# Patient Record
Sex: FEMALE | Race: WHITE | NOT HISPANIC OR LATINO | Employment: OTHER | URBAN - NONMETROPOLITAN AREA
[De-identification: names, ages, dates, MRNs, and addresses within clinical notes are randomized per-mention and may not be internally consistent; named-entity substitution may affect disease eponyms.]

---

## 2017-01-05 DIAGNOSIS — E78.5 DYSLIPIDEMIA: ICD-10-CM

## 2017-01-06 RX ORDER — PRAVASTATIN SODIUM 20 MG
20 TABLET ORAL
Qty: 90 TAB | Refills: 3 | Status: SHIPPED | OUTPATIENT
Start: 2017-01-06 | End: 2017-12-27 | Stop reason: SDUPTHER

## 2017-02-08 ENCOUNTER — OFFICE VISIT (OUTPATIENT)
Dept: MEDICAL GROUP | Facility: PHYSICIAN GROUP | Age: 67
End: 2017-02-08
Payer: MEDICARE

## 2017-02-08 VITALS
HEIGHT: 64 IN | SYSTOLIC BLOOD PRESSURE: 140 MMHG | DIASTOLIC BLOOD PRESSURE: 72 MMHG | TEMPERATURE: 97.3 F | HEART RATE: 63 BPM | BODY MASS INDEX: 31.92 KG/M2 | OXYGEN SATURATION: 96 % | WEIGHT: 187 LBS | RESPIRATION RATE: 16 BRPM

## 2017-02-08 DIAGNOSIS — Z23 NEED FOR PNEUMOCOCCAL VACCINATION: ICD-10-CM

## 2017-02-08 DIAGNOSIS — R94.4 DECREASED GFR: Primary | ICD-10-CM

## 2017-02-08 PROCEDURE — 1101F PT FALLS ASSESS-DOCD LE1/YR: CPT | Performed by: NURSE PRACTITIONER

## 2017-02-08 PROCEDURE — 1036F TOBACCO NON-USER: CPT | Performed by: NURSE PRACTITIONER

## 2017-02-08 PROCEDURE — G8484 FLU IMMUNIZE NO ADMIN: HCPCS | Performed by: NURSE PRACTITIONER

## 2017-02-08 PROCEDURE — 3017F COLORECTAL CA SCREEN DOC REV: CPT | Performed by: NURSE PRACTITIONER

## 2017-02-08 PROCEDURE — 99213 OFFICE O/P EST LOW 20 MIN: CPT | Performed by: NURSE PRACTITIONER

## 2017-02-08 PROCEDURE — G8432 DEP SCR NOT DOC, RNG: HCPCS | Performed by: NURSE PRACTITIONER

## 2017-02-08 PROCEDURE — 3014F SCREEN MAMMO DOC REV: CPT | Performed by: NURSE PRACTITIONER

## 2017-02-08 PROCEDURE — G8419 CALC BMI OUT NRM PARAM NOF/U: HCPCS | Performed by: NURSE PRACTITIONER

## 2017-02-08 PROCEDURE — 4040F PNEUMOC VAC/ADMIN/RCVD: CPT | Mod: 8P | Performed by: NURSE PRACTITIONER

## 2017-02-08 ASSESSMENT — PAIN SCALES - GENERAL: PAINLEVEL: NO PAIN

## 2017-02-08 NOTE — MR AVS SNAPSHOT
"        Karolina LOPEZ Mando   2017 4:20 PM   Office Visit   MRN: [id number]    Department:  [facility]   Dept Phone:  [phone]    Description:  Female : 1950   Provider:  FELICE Cherry           Reason for Visit     Follow-Up lab results       Allergies as of 2017     Allergen Noted Reactions    Augmentin      Ciprofloxacin      Neomycin 2017         You were diagnosed with     Need for pneumococcal vaccination   [[id number]]       Decreased GFR   [[id number]]         Vital Signs     Blood Pressure Pulse Temperature Respirations Height Weight    140/72 mmHg 63 36.3 °C (97.3 °F) 16 1.626 m (5' 4\") 84.823 kg (187 lb)    Body Mass Index Oxygen Saturation Smoking Status             32.08 kg/m2 96% Never Smoker          Basic Information     Date Of Birth Sex Race Ethnicity Preferred Language    1950 Female White Non- English      Your appointments     2018  2:20 PM   Established Patient with Adry MALDONADO M.D.   [facility] (--)    [address], [place] [id number] [phone]           You will be receiving a confirmation call a few days before your appointment from our automated call confirmation system.              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   2014 - Present    Cerumen debris on tympanic membrane of right ear H61.21   2016 - Present    Mitral regurgitation I34.0   2016 - Present    Decreased GFR R94.4   2017 - Present      Health Maintenance        Date Due Completion Dates    IMM DTaP/Tdap/Td Vaccine " (1 - Tdap) 2/27/1970 ---    IMM ZOSTER VACCINE 2/27/2011 ---    IMM PNEUMOCOCCAL 65+ (ADULT) LOW/MEDIUM RISK SERIES (1 of 2 - PCV13) 2/27/2016 ---    IMM INFLUENZA (1) 9/1/2016 ---    MAMMOGRAM 10/14/2017 10/14/2016, 10/12/2015, 10/10/2014, 10/9/2013, 9/27/2012, 9/21/2011, 9/20/2010, 9/8/2009, 9/8/2009, 8/27/2008, 8/27/2008, 8/22/2007, 8/22/2007    COLONOSCOPY 3/6/2019 3/6/2014    BONE DENSITY 10/12/2020 10/12/2015, 5/6/2009, 11/10/2005            Current Immunizations     13-VALENT PCV PREVNAR  Deferred (Patient Refused)      Below and/or attached are the medications your provider expects you to take. Review all of your home medications and newly ordered medications with your provider and/or pharmacist. Follow medication instructions as directed by your provider and/or pharmacist. Please keep your medication list with you and share with your provider. Update the information when medications are discontinued, doses are changed, or new medications (including over-the-counter products) are added; and carry medication information at all times in the event of emergency situations     Allergies:  AUGMENTIN - (reactions not documented)     CIPROFLOXACIN - (reactions not documented)     NEOMYCIN - (reactions not documented)               Medications  Valid as of: February 08, 2017 -  4:28 PM    Generic Name Brand Name Tablet Size Instructions for use    AmLODIPine Besylate (Tab) NORVASC 10 MG TAKE ONE TABLET BY MOUTH ONE TIME DAILY        Aspirin (Tablet Delayed Response) ECOTRIN 81 MG Take 81 mg by mouth every day.        Cholecalciferol (Tab) cholecalciferol 1000 UNIT Take 1,000 Units by mouth every day.        Estradiol (Tab) ESTRACE 0.5 MG Take 0.5 mg by mouth every day.        Lisinopril (Tab) PRINIVIL, ZESTRIL 40 MG Take 1 Tab by mouth every day.        Multiple Vitamins-Minerals   Take  by mouth.        Omega-3 Fatty Acids (Cap) OMEGA 3 FA 1000 MG Take 1,000 mg by mouth every day.        Pantoprazole Sodium (Tablet  Delayed Response) PROTONIX 40 MG Take 40 mg by mouth every day.        Pravastatin Sodium (Tab) PRAVACHOL 20 MG Take 1 Tab by mouth every bedtime.        Probiotic Product   Take  by mouth.        .                 Medicines prescribed today [facility] to:     [facility] [place] - [address]    [address] [address]    Phone: [phone] Fax: [phone]    Open 24 Hours?: No      Medication refill instructions:       If your prescription bottle indicates you have medication refills left, it is not necessary to call your provider’s office. Please contact your pharmacy and they will refill your medication.    If your prescription bottle indicates you do not have any refills left, you may request refills at any time through one of the following ways: The online [employer] system (except Urgent Care), by calling your provider’s office, or by asking your pharmacy to contact your provider’s office with a refill request. Medication refills are processed only during regular business hours and may not be available until the next business day. Your provider may request additional information or to have a follow-up visit with you prior to refilling your medication.   *Please Note: Medication refills are assigned a new Rx number when refilled electronically. Your pharmacy may indicate that no refills were authorized even though a new prescription for the same medication is available at the pharmacy. Please request the medicine by name with the pharmacy before contacting your provider for a refill.        Instructions    Pneumococcal Vaccine, Polyvalent suspension for injection  What is this medicine?  PNEUMOCOCCAL VACCINE, POLYVALENT (AMARIS mo YOSVANY al navya DAVIS, rigo saw muhammad) is a vaccine to prevent pneumococcus bacteria infection. These bacteria are a major cause of ear infections, 'Strep throat' infections, and serious pneumonia, meningitis, or blood infections worldwide. These vaccines  help the body to produce antibodies (protective substances) that help your body defend against these bacteria. This vaccine is recommended for infants and young children. This vaccine will not treat an infection.  This medicine may be used for other purposes; ask your health care provider or pharmacist if you have questions.  COMMON BRAND NAME(S): Prevnar 13 , Prevnar  What should I tell my health care provider before I take this medicine?  They need to know if you have any of these conditions:  -bleeding problems  -fever  -immune system problems  -low platelet count in the blood  -seizures  -an unusual or allergic reaction to pneumococcal vaccine, diphtheria toxoid, other vaccines, latex, other medicines, foods, dyes, or preservatives  -pregnant or trying to get pregnant  -breast-feeding  How should I use this medicine?  This vaccine is for injection into a muscle. It is given by a health care professional.  A copy of Vaccine Information Statements will be given before each vaccination. Read this sheet carefully each time. The sheet may change frequently.  Talk to your pediatrician regarding the use of this medicine in children. While this drug may be prescribed for children as young as 6 weeks old for selected conditions, precautions do apply.  Overdosage: If you think you have taken too much of this medicine contact a poison control center or emergency room at once.  NOTE: This medicine is only for you. Do not share this medicine with others.  What if I miss a dose?  It is important not to miss your dose. Call your doctor or health care professional if you are unable to keep an appointment.  What may interact with this medicine?  -medicines for cancer chemotherapy  -medicines that suppress your immune function  -medicines that treat or prevent blood clots like warfarin, enoxaparin, and dalteparin  -steroid medicines like prednisone or cortisone  This list may not describe all possible interactions. Give your  health care provider a list of all the medicines, herbs, non-prescription drugs, or dietary supplements you use. Also tell them if you smoke, drink alcohol, or use illegal drugs. Some items may interact with your medicine.  What should I watch for while using this medicine?  Mild fever and pain should go away in 3 days or less. Report any unusual symptoms to your doctor or health care professional.  What side effects may I notice from receiving this medicine?  Side effects that you should report to your doctor or health care professional as soon as possible:  -allergic reactions like skin rash, itching or hives, swelling of the face, lips, or tongue  -breathing problems  -confused  -fever over 102 degrees F  -pain, tingling, numbness in the hands or feet  -seizures  -unusual bleeding or bruising  -unusual muscle weakness  Side effects that usually do not require medical attention (report to your doctor or health care professional if they continue or are bothersome):  -aches and pains  -diarrhea  -fever of 102 degrees F or less  -headache  -irritable  -loss of appetite  -pain, tender at site where injected  -trouble sleeping  This list may not describe all possible side effects. Call your doctor for medical advice about side effects. You may report side effects to FDA at 1-293-FDA-1354.  Where should I keep my medicine?  This does not apply. This vaccine is given in a clinic, pharmacy, doctor's office, or other health care setting and will not be stored at home.  NOTE: This sheet is a summary. It may not cover all possible information. If you have questions about this medicine, talk to your doctor, pharmacist, or health care provider.  © 2014, Elsevier/Gold Standard. (3/2/2010 10:17:22 AM)            [employer] Access Code: Activation code not generated  Current [employer] Status: Active

## 2017-02-09 NOTE — PATIENT INSTRUCTIONS
Pneumococcal Vaccine, Polyvalent suspension for injection  What is this medicine?  PNEUMOCOCCAL VACCINE, POLYVALENT (AMARIS mo YOSVANY al navya DAVIS, rigo muhammad) is a vaccine to prevent pneumococcus bacteria infection. These bacteria are a major cause of ear infections, 'Strep throat' infections, and serious pneumonia, meningitis, or blood infections worldwide. These vaccines help the body to produce antibodies (protective substances) that help your body defend against these bacteria. This vaccine is recommended for infants and young children. This vaccine will not treat an infection.  This medicine may be used for other purposes; ask your health care provider or pharmacist if you have questions.  COMMON BRAND NAME(S): Prevnar 13 , Prevnar  What should I tell my health care provider before I take this medicine?  They need to know if you have any of these conditions:  -bleeding problems  -fever  -immune system problems  -low platelet count in the blood  -seizures  -an unusual or allergic reaction to pneumococcal vaccine, diphtheria toxoid, other vaccines, latex, other medicines, foods, dyes, or preservatives  -pregnant or trying to get pregnant  -breast-feeding  How should I use this medicine?  This vaccine is for injection into a muscle. It is given by a health care professional.  A copy of Vaccine Information Statements will be given before each vaccination. Read this sheet carefully each time. The sheet may change frequently.  Talk to your pediatrician regarding the use of this medicine in children. While this drug may be prescribed for children as young as 6 weeks old for selected conditions, precautions do apply.  Overdosage: If you think you have taken too much of this medicine contact a poison control center or emergency room at once.  NOTE: This medicine is only for you. Do not share this medicine with others.  What if I miss a dose?  It is important not to miss your dose. Call your doctor or health care  professional if you are unable to keep an appointment.  What may interact with this medicine?  -medicines for cancer chemotherapy  -medicines that suppress your immune function  -medicines that treat or prevent blood clots like warfarin, enoxaparin, and dalteparin  -steroid medicines like prednisone or cortisone  This list may not describe all possible interactions. Give your health care provider a list of all the medicines, herbs, non-prescription drugs, or dietary supplements you use. Also tell them if you smoke, drink alcohol, or use illegal drugs. Some items may interact with your medicine.  What should I watch for while using this medicine?  Mild fever and pain should go away in 3 days or less. Report any unusual symptoms to your doctor or health care professional.  What side effects may I notice from receiving this medicine?  Side effects that you should report to your doctor or health care professional as soon as possible:  -allergic reactions like skin rash, itching or hives, swelling of the face, lips, or tongue  -breathing problems  -confused  -fever over 102 degrees F  -pain, tingling, numbness in the hands or feet  -seizures  -unusual bleeding or bruising  -unusual muscle weakness  Side effects that usually do not require medical attention (report to your doctor or health care professional if they continue or are bothersome):  -aches and pains  -diarrhea  -fever of 102 degrees F or less  -headache  -irritable  -loss of appetite  -pain, tender at site where injected  -trouble sleeping  This list may not describe all possible side effects. Call your doctor for medical advice about side effects. You may report side effects to FDA at 4-468-FDA-1744.  Where should I keep my medicine?  This does not apply. This vaccine is given in a clinic, pharmacy, doctor's office, or other health care setting and will not be stored at home.  NOTE: This sheet is a summary. It may not cover all possible information. If you  have questions about this medicine, talk to your doctor, pharmacist, or health care provider.  © 2014, Elsevier/Gold Standard. (3/2/2010 10:17:22 AM)

## 2017-02-09 NOTE — ASSESSMENT & PLAN NOTE
This is a new problem. Reviewed labs with the patient, GFR modestly depressed at 54, creatinine, potassium, BUN are all normal. Patient denies daily use of NSAIDs. She is not diabetic. She does have hypertension and this is well controlled on ACE inhibitor/lisinopril 40 mg and amlodipine 10 mg daily. We discussed the importance of pushing fluids, hypertension and glucose control. Avoid NSAIDs whenever possible. Monitor yearly.

## 2017-02-09 NOTE — PROGRESS NOTES
[facility]  Primary Care Office Visit - Problem-Oriented        History:     Karolina Gilmore is a 66 y.o. female who is here today to discuss Follow-Up      Decreased GFR  This is a new problem. Reviewed labs with the patient, GFR modestly depressed at 54, creatinine, potassium, BUN are all normal. Patient denies daily use of NSAIDs. She is not diabetic. She does have hypertension and this is well controlled on ACE inhibitor/lisinopril 40 mg and amlodipine 10 mg daily. We discussed the importance of pushing fluids, hypertension and glucose control. Avoid NSAIDs whenever possible. Monitor yearly.    Discussed shingles vaccine, patient is allergic to neomycin which is a component of Zostavax, she is not a candidate. Due for pneumonia vaccines, discussed Prevnar 13, patient would like more information on this and will call the office if she would like to have this done.      Past Medical History   Diagnosis Date   • GERD (gastroesophageal reflux disease)    • WEBBER (nonalcoholic steatohepatitis)    • Cerumen debris on tympanic membrane of right ear 5/24/2016     Past Surgical History   Procedure Laterality Date   • Hysterectomy, total abdominal  96   • Carpal tunnel release       right     Social History     Social History   • Marital Status:      Spouse Name: N/A   • Number of Children: N/A   • Years of Education: N/A     Occupational History   • Not on file.     Social History Main Topics   • Smoking status: Never Smoker    • Smokeless tobacco: Never Used   • Alcohol Use: 0.0 oz/week     0 Standard drinks or equivalent per week      Comment: once a month   • Drug Use: No   • Sexual Activity: No     Other Topics Concern   • Not on file     Social History Narrative     History   Smoking status   • Never Smoker    Smokeless tobacco   • Never Used     Family History   Problem Relation Age of Onset   • Heart Attack Mother 76   • Stroke Mother    • Other Father 52     Brain tumour      Allergies  "  Allergen Reactions   • Augmentin    • Ciprofloxacin    • Neomycin        Problem List:     Patient Active Problem List    Diagnosis Date Noted   • Decreased GFR 02/08/2017   • Mitral regurgitation 11/07/2016   • Cerumen debris on tympanic membrane of right ear 05/24/2016   • Hypertension 04/17/2014   • Pericardial effusion 04/17/2014   • Postmenopausal HRT (hormone replacement therapy) 04/17/2014   • Dyslipidemia 04/17/2014   • GERD (gastroesophageal reflux disease) 04/17/2014   • Femoral acetabular impingement 04/17/2014   • HEMATURIA 07/22/2013         Medications:     Current outpatient prescriptions:   •  pravastatin (PRAVACHOL) 20 MG Tab, Take 1 Tab by mouth every bedtime., Disp: 90 Tab, Rfl: 3  •  lisinopril (PRINIVIL, ZESTRIL) 40 MG tablet, Take 1 Tab by mouth every day., Disp: 90 Tab, Rfl: 3  •  amlodipine (NORVASC) 10 MG Tab, TAKE ONE TABLET BY MOUTH ONE TIME DAILY, Disp: 90 Tab, Rfl: 3  •  estradiol (ESTRACE) 0.5 MG tablet, Take 0.5 mg by mouth every day., Disp: , Rfl:   •  aspirin EC (ECOTRIN) 81 MG Tablet Delayed Response, Take 81 mg by mouth every day., Disp: , Rfl:   •  pantoprazole (PROTONIX) 40 MG TBEC, Take 40 mg by mouth every day., Disp: , Rfl:   •  Probiotic Product (PROBIOTIC DAILY PO), Take  by mouth., Disp: , Rfl:   •  Multiple Vitamins-Minerals (MULTIVITAMIN PO), Take  by mouth., Disp: , Rfl:   •  vitamin D (CHOLECALCIFEROL) 1000 UNIT TABS, Take 1,000 Units by mouth every day., Disp: , Rfl:   •  docosahexanoic acid (OMEGA 3 FA) 1000 MG CAPS, Take 1,000 mg by mouth every day., Disp: , Rfl:       Review of Systems:     Comprehensive review of systems negative.    Physical Assessment:     VS: /72 mmHg  Pulse 63  Temp(Src) 36.3 °C (97.3 °F)  Resp 16  Ht 1.626 m (5' 4\")  Wt 84.823 kg (187 lb)  BMI 32.08 kg/m2  SpO2 96%    General: Well-developed, well-nourished female     Head: PERRL, EOMI. Normocephalic. No facial asymmetry noted.  Cardiovasc:No JVD.  RRR. Systolic murmur. No " thrills or bruits. Pulses 2+ and symmetric at all distal extremities.  Pulmonary: Lungs clear bilaterally.  Normal respiratory effort. No wheeze or crackles.   Extremities: No edema, no TTP bilateral calves. Pedal pulses intact. No joint effusions. LEs warm and well-perfused.  Neuro: Alert and oriented  Skin:No rashes noted. Skin warm, dry, intact      Psych: Dressed appropriately for the weather, pleasant and conversant.  Affect, mood & judgment appropriate.    Assessment/Plan:   Karolina was seen today for follow-up.    Diagnoses and all orders for this visit:    Decreased GFR, new   - Reassurance provided, push fluids, avoid NSAIDs, continue ACE inhibitor, discussed the importance of hypertensive and glycemic control. Recommend monitoring every 6-12 months.    Need for pneumococcal vaccination  -     PNEUMOCOCCAL CONJUGATE VACCINE 13-VALENT    Patient is agreeable to the above plan and voiced understanding. All questions answered.     Please note that this dictation was created using voice recognition software. I have made every reasonable attempt to correct obvious errors, but I expect that there are errors of grammar and possibly content that I did not discover before finalizing the note.      DENNY Hickey  2/8/2017, 4:29 PM

## 2017-05-24 ENCOUNTER — OFFICE VISIT (OUTPATIENT)
Dept: MEDICAL GROUP | Facility: PHYSICIAN GROUP | Age: 67
End: 2017-05-24
Payer: MEDICARE

## 2017-05-24 VITALS
OXYGEN SATURATION: 93 % | HEIGHT: 64 IN | SYSTOLIC BLOOD PRESSURE: 140 MMHG | RESPIRATION RATE: 16 BRPM | HEART RATE: 76 BPM | TEMPERATURE: 97.5 F | DIASTOLIC BLOOD PRESSURE: 82 MMHG | WEIGHT: 187 LBS | BODY MASS INDEX: 31.92 KG/M2

## 2017-05-24 DIAGNOSIS — I10 ESSENTIAL HYPERTENSION: ICD-10-CM

## 2017-05-24 DIAGNOSIS — R42 VERTIGO: ICD-10-CM

## 2017-05-24 DIAGNOSIS — E78.5 HYPERLIPIDEMIA, UNSPECIFIED HYPERLIPIDEMIA TYPE: ICD-10-CM

## 2017-05-24 PROCEDURE — G8432 DEP SCR NOT DOC, RNG: HCPCS | Performed by: INTERNAL MEDICINE

## 2017-05-24 PROCEDURE — 3014F SCREEN MAMMO DOC REV: CPT | Performed by: INTERNAL MEDICINE

## 2017-05-24 PROCEDURE — 99214 OFFICE O/P EST MOD 30 MIN: CPT | Performed by: INTERNAL MEDICINE

## 2017-05-24 PROCEDURE — 4040F PNEUMOC VAC/ADMIN/RCVD: CPT | Mod: 8P | Performed by: INTERNAL MEDICINE

## 2017-05-24 PROCEDURE — G8419 CALC BMI OUT NRM PARAM NOF/U: HCPCS | Performed by: INTERNAL MEDICINE

## 2017-05-24 PROCEDURE — 1101F PT FALLS ASSESS-DOCD LE1/YR: CPT | Performed by: INTERNAL MEDICINE

## 2017-05-24 PROCEDURE — 3017F COLORECTAL CA SCREEN DOC REV: CPT | Performed by: INTERNAL MEDICINE

## 2017-05-24 PROCEDURE — 1036F TOBACCO NON-USER: CPT | Performed by: INTERNAL MEDICINE

## 2017-05-24 RX ORDER — MECLIZINE HCL 12.5 MG/1
12.5 TABLET ORAL 3 TIMES DAILY PRN
Qty: 30 TAB | Refills: 0 | Status: SHIPPED | OUTPATIENT
Start: 2017-05-24 | End: 2017-05-31 | Stop reason: SDUPTHER

## 2017-05-24 ASSESSMENT — PATIENT HEALTH QUESTIONNAIRE - PHQ9: CLINICAL INTERPRETATION OF PHQ2 SCORE: 0

## 2017-05-24 ASSESSMENT — PAIN SCALES - GENERAL: PAINLEVEL: NO PAIN

## 2017-05-24 NOTE — ASSESSMENT & PLAN NOTE
Follows at home and has noted no change, am readings 115-120/70 generally.  She is taking amlodipine and lisinopril.

## 2017-05-24 NOTE — PROGRESS NOTES
Chief Complaint   Patient presents with   • Dizziness     dizziness x6 weeks        HISTORY OF PRESENT ILLNESS: Patient is a 67 y.o. female established patient who presents today to discuss the medical issues below.    Vertigo  Patient is reporting a new complaint, she did take a long drive to [place]. Upon getting home she had reoccurrence of vague wobbly sensation. She's had this in the past and it resolved. Currently for the last 6 weeks it is persisting. It does not seem to be getting better or worse. It is not necessarily associated with position changes she is not having any problems with headaches or visual problems no double vision, she does note a jerking sensation of her neck periodically which does not seem to be associated with the wobbliness. The neck jerking also ×6 weeks. No nausea or vomiting. No numbness tingling or weakness of her arms or legs. No difficulty with hearing no muffled sounds sensation no sinus congestion. She has not tried any medications but she did look up on the Internet and is wondering if she has disembarkment syndrome.  Patient is vaguely able to identify that it's worse in the morning and improves throughout the day but does tend to come and go all day long several times during the day.    Hypertension  Follows at home and has noted no change, am readings 115-120/70 generally.  She is taking amlodipine and lisinopril.       Patient Active Problem List    Diagnosis Date Noted   • Vertigo 05/24/2017   • Decreased GFR 02/08/2017   • Mitral regurgitation 11/07/2016   • Cerumen debris on tympanic membrane of right ear 05/24/2016   • Hypertension 04/17/2014   • Pericardial effusion 04/17/2014   • Postmenopausal HRT (hormone replacement therapy) 04/17/2014   • Dyslipidemia 04/17/2014   • GERD (gastroesophageal reflux disease) 04/17/2014   • Femoral acetabular impingement 04/17/2014   • HEMATURIA 07/22/2013       Allergies:Augmentin; Ciprofloxacin; and Neomycin    Current Outpatient  Prescriptions   Medication Sig Dispense Refill   • meclizine (ANTIVERT) 12.5 MG Tab Take 1 Tab by mouth 3 times a day as needed. 30 Tab 0   • pravastatin (PRAVACHOL) 20 MG Tab Take 1 Tab by mouth every bedtime. 90 Tab 3   • lisinopril (PRINIVIL, ZESTRIL) 40 MG tablet Take 1 Tab by mouth every day. 90 Tab 3   • amlodipine (NORVASC) 10 MG Tab TAKE ONE TABLET BY MOUTH ONE TIME DAILY 90 Tab 3   • estradiol (ESTRACE) 0.5 MG tablet Take 0.5 mg by mouth every day.     • aspirin EC (ECOTRIN) 81 MG Tablet Delayed Response Take 81 mg by mouth every day.     • pantoprazole (PROTONIX) 40 MG TBEC Take 40 mg by mouth every day.     • Probiotic Product (PROBIOTIC DAILY PO) Take  by mouth.     • Multiple Vitamins-Minerals (MULTIVITAMIN PO) Take  by mouth.     • vitamin D (CHOLECALCIFEROL) 1000 UNIT TABS Take 1,000 Units by mouth every day.     • docosahexanoic acid (OMEGA 3 FA) 1000 MG CAPS Take 1,000 mg by mouth every day.       No current facility-administered medications for this visit.         Past Medical History   Diagnosis Date   • GERD (gastroesophageal reflux disease)    • WEBBER (nonalcoholic steatohepatitis)    • Cerumen debris on tympanic membrane of right ear 2016   • Vertigo 2017       Social History   Substance Use Topics   • Smoking status: Never Smoker    • Smokeless tobacco: Never Used   • Alcohol Use: No       Family Status   Relation Status Death Age   • Mother     • Father     • Sister  63     dementia     Family History   Problem Relation Age of Onset   • Heart Attack Mother 76   • Stroke Mother    • Other Father 52     Brain tumour        ROS:    Respiratory: Negative for cough, sputum production, shortness of breath or wheezing.    Cardiovascular: Negative for chest pain, palpitations, orthopnea, dyspnea with exertion or edema.   Gastrointestinal: Negative for GI upset, nausea, vomiting, abdominal pain, constipation or diarrhea.   Genitourinary: Negative for dysuria, urgency,  "hesitancy or frequency.       Exam:    Blood pressure 140/82, pulse 76, temperature 36.4 °C (97.5 °F), resp. rate 16, height 1.626 m (5' 4.02\"), weight 84.823 kg (187 lb), SpO2 93 %.  General:  Well nourished, well developed female in NAD.  HENT: Normocephalic, bilateral TMs are intact, nasal and oral mucosa with no lesions,   Neck: Supple without bruit. Thyroid is not enlarged.  Pulmonary: Clear to ausculation and percussion.  Normal effort. No rales, rhonchi, or wheezing.  Cardiovascular: Regular rate and rhythm without murmur.   Abdomen: Normal bowel sounds soft and nontender no palpable liver spleen bladder mass.  Extremities: No LE edema noted.  Neuro: Grossly nonfocal. No nystagmus no recurrence of symptoms with Epley maneuver, negative Romberg, gait stable. Vague slight intention tremor with finger nose testing, vague ratcheting on rigidity testing.  Psych: Alert and oriented to person, place, and time. Appropriate mood and conversation.        This dictation was created using voice recognition software. I have made reasonable attempts to correct errors, however, errors of grammar and content may exist.          Assessment/Plan:    1. Vertigo  Neurologic exam is normal today. She does not have clinical history consistent with vestibular neuronitis or positional vertigo. Differential could include an early Parkinson's or other nonspecific neurologic problem. Will check a TSH, referral to neurology. Meclizine for when necessary monitor. Atypical for dismembarkment syndrome to my examination.  - REFERRAL TO NEUROLOGY    2. Essential hypertension  Borderline here home readings are better ongoing monitoring with labs  - COMP METABOLIC PANEL; Future  - CBC WITH DIFFERENTIAL; Future    3. Hyperlipidemia, unspecified hyperlipidemia type  On meds due for lab monitoring  - LIPID PROFILE; Future    Patient was seen for  25 minutes face to face of which more than 50% of the time was spent in counseling and coordination " of care regarding the above problems.

## 2017-05-24 NOTE — MR AVS SNAPSHOT
"        Karolina LOPEZ Gilmore   2017 2:00 PM   Office Visit   MRN: [id number]    Department:  [facility]   Dept Phone:  [phone]    Description:  Female : 1950   Provider:  Adry MALDONADO M.D.           Reason for Visit     Dizziness dizziness x6 weeks       Allergies as of 2017     Allergen Noted Reactions    Augmentin      Ciprofloxacin      Neomycin 2017         You were diagnosed with     Vertigo   [845309]       Essential hypertension   [4049611]       Hyperlipidemia, unspecified hyperlipidemia type   [1275627]         Vital Signs     Blood Pressure Pulse Temperature Respirations Height Weight    140/82 mmHg 76 36.4 °C (97.5 °F) 16 1.626 m (5' 4.02\") 84.823 kg (187 lb)    Body Mass Index Oxygen Saturation Smoking Status             32.08 kg/m2 93% Never Smoker          Basic Information     Date Of Birth Sex Race Ethnicity Preferred Language    1950 Female White Non- English      Your appointments     Sep 14, 2017  2:00 PM   Established Patient with Adry MALDONADO M.D.   [facility] (--)    [address] [id number] [phone]           You will be receiving a confirmation call a few days before your appointment from our automated call confirmation system.              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   2014 - Present    Cerumen debris on tympanic membrane of right ear H61.21   2016 - Present    Mitral regurgitation I34.0   2016 - Present    Decreased GFR R94.4   2017 - Present    Vertigo R42   2017 " - Present      Health Maintenance        Date Due Completion Dates    IMM DTaP/Tdap/Td Vaccine (1 - Tdap) 2/27/1970 ---    IMM ZOSTER VACCINE 2/27/2011 ---    IMM PNEUMOCOCCAL 65+ (ADULT) LOW/MEDIUM RISK SERIES (1 of 2 - PCV13) 2/27/2016 ---    MAMMOGRAM 10/14/2017 10/14/2016, 10/12/2015, 10/10/2014, 10/9/2013, 9/27/2012, 9/21/2011, 9/20/2010, 9/8/2009, 9/8/2009, 8/27/2008, 8/27/2008, 8/22/2007, 8/22/2007    COLONOSCOPY 3/6/2019 3/6/2014    BONE DENSITY 10/12/2020 10/12/2015, 5/6/2009, 11/10/2005            Current Immunizations     13-VALENT PCV PREVNAR  Deferred (Patient Refused)      Below and/or attached are the medications your provider expects you to take. Review all of your home medications and newly ordered medications with your provider and/or pharmacist. Follow medication instructions as directed by your provider and/or pharmacist. Please keep your medication list with you and share with your provider. Update the information when medications are discontinued, doses are changed, or new medications (including over-the-counter products) are added; and carry medication information at all times in the event of emergency situations     Allergies:  AUGMENTIN - (reactions not documented)     CIPROFLOXACIN - (reactions not documented)     NEOMYCIN - (reactions not documented)               Medications  Valid as of: May 24, 2017 -  2:45 PM    Generic Name Brand Name Tablet Size Instructions for use    AmLODIPine Besylate (Tab) NORVASC 10 MG TAKE ONE TABLET BY MOUTH ONE TIME DAILY        Aspirin (Tablet Delayed Response) ECOTRIN 81 MG Take 81 mg by mouth every day.        Cholecalciferol (Tab) cholecalciferol 1000 UNIT Take 1,000 Units by mouth every day.        Estradiol (Tab) ESTRACE 0.5 MG Take 0.5 mg by mouth every day.        Lisinopril (Tab) PRINIVIL, ZESTRIL 40 MG Take 1 Tab by mouth every day.        Meclizine HCl (Tab) ANTIVERT 12.5 MG Take 1 Tab by mouth 3 times a day as needed.        Multiple  Vitamins-Minerals   Take  by mouth.        Omega-3 Fatty Acids (Cap) OMEGA 3 FA 1000 MG Take 1,000 mg by mouth every day.        Pantoprazole Sodium (Tablet Delayed Response) PROTONIX 40 MG Take 40 mg by mouth every day.        Pravastatin Sodium (Tab) PRAVACHOL 20 MG Take 1 Tab by mouth every bedtime.        Probiotic Product   Take  by mouth.        .                 Medicines prescribed today were sent to:     [facility] # [place], [place] - [address] [facility] [address]    Phone: [phone] Fax: [phone]    Open 24 Hours?: No      Medication refill instructions:       If your prescription bottle indicates you have medication refills left, it is not necessary to call your provider’s office. Please contact your pharmacy and they will refill your medication.    If your prescription bottle indicates you do not have any refills left, you may request refills at any time through one of the following ways: The online [facility] system (except Urgent Care), by calling your provider’s office, or by asking your pharmacy to contact your provider’s office with a refill request. Medication refills are processed only during regular business hours and may not be available until the next business day. Your provider may request additional information or to have a follow-up visit with you prior to refilling your medication.   *Please Note: Medication refills are assigned a new Rx number when refilled electronically. Your pharmacy may indicate that no refills were authorized even though a new prescription for the same medication is available at the pharmacy. Please request the medicine by name with the pharmacy before contacting your provider for a refill.        Your To Do List     Future Labs/Procedures Complete By Expires    CBC WITH DIFFERENTIAL  As directed 5/24/2018    COMP METABOLIC PANEL  As directed 5/24/2018    LIPID PROFILE  As directed 5/24/2018      Referral     A referral request  has been sent to our patient care coordination department. Please allow 3-5 business days for us to process this request and contact you either by phone or mail. If you do not hear from us by the 5th business day, please call us at [phone].        Instructions    bp monitor at different times of day  Neurology  Recheck labs.            Medrio Access Code: Activation code not generated  Current Medrio Status: Active

## 2017-05-24 NOTE — ASSESSMENT & PLAN NOTE
Patient is reporting a new complaint, she did take a long drive to [place]. Upon getting home she had reoccurrence of vague wobbly sensation. She's had this in the past and it resolved. Currently for the last 6 weeks it is persisting. It does not seem to be getting better or worse. It is not necessarily associated with position changes she is not having any problems with headaches or visual problems no double vision, she does note a jerking sensation of her neck periodically which does not seem to be associated with the wobbliness. The neck jerking also ×6 weeks. No nausea or vomiting. No numbness tingling or weakness of her arms or legs. No difficulty with hearing no muffled sounds sensation no sinus congestion. She has not tried any medications but she did look up on the Internet and is wondering if she has disembarkment syndrome.  Patient is vaguely able to identify that it's worse in the morning and improves throughout the day but does tend to come and go all day long several times during the day.

## 2017-05-31 RX ORDER — MECLIZINE HCL 12.5 MG/1
12.5 TABLET ORAL 3 TIMES DAILY PRN
Qty: 30 TAB | Refills: 0 | Status: SHIPPED | OUTPATIENT
Start: 2017-05-31 | End: 2017-06-25 | Stop reason: SDUPTHER

## 2017-07-14 ENCOUNTER — APPOINTMENT (OUTPATIENT)
Dept: SCHEDULING | Facility: IMAGING CENTER | Age: 67
End: 2017-07-14
Payer: MEDICARE

## 2017-10-18 ENCOUNTER — HOSPITAL ENCOUNTER (OUTPATIENT)
Dept: RADIOLOGY | Facility: MEDICAL CENTER | Age: 67
End: 2017-10-18
Attending: OBSTETRICS & GYNECOLOGY
Payer: MEDICARE

## 2017-10-18 DIAGNOSIS — Z12.31 VISIT FOR SCREENING MAMMOGRAM: ICD-10-CM

## 2017-10-18 PROCEDURE — G0202 SCR MAMMO BI INCL CAD: HCPCS

## 2017-10-24 ENCOUNTER — HOSPITAL ENCOUNTER (OUTPATIENT)
Dept: RADIOLOGY | Facility: MEDICAL CENTER | Age: 67
End: 2017-10-24
Attending: OBSTETRICS & GYNECOLOGY
Payer: MEDICARE

## 2017-10-24 DIAGNOSIS — R92.8 ABNORMAL FINDING ON BREAST IMAGING: ICD-10-CM

## 2017-10-24 PROCEDURE — G0206 DX MAMMO INCL CAD UNI: HCPCS | Mod: RT

## 2017-10-25 ENCOUNTER — HOSPITAL ENCOUNTER (OUTPATIENT)
Dept: LAB | Facility: MEDICAL CENTER | Age: 67
End: 2017-10-25
Attending: INTERNAL MEDICINE
Payer: MEDICARE

## 2017-10-25 DIAGNOSIS — E78.5 DYSLIPIDEMIA: ICD-10-CM

## 2017-10-25 LAB
ALBUMIN SERPL BCP-MCNC: 4.2 G/DL (ref 3.2–4.9)
ALBUMIN/GLOB SERPL: 1.5 G/DL
ALP SERPL-CCNC: 80 U/L (ref 30–99)
ALT SERPL-CCNC: 15 U/L (ref 2–50)
ANION GAP SERPL CALC-SCNC: 9 MMOL/L (ref 0–11.9)
AST SERPL-CCNC: 21 U/L (ref 12–45)
BILIRUB SERPL-MCNC: 0.5 MG/DL (ref 0.1–1.5)
BUN SERPL-MCNC: 12 MG/DL (ref 8–22)
CALCIUM SERPL-MCNC: 9.1 MG/DL (ref 8.5–10.5)
CHLORIDE SERPL-SCNC: 105 MMOL/L (ref 96–112)
CHOLEST SERPL-MCNC: 180 MG/DL (ref 100–199)
CO2 SERPL-SCNC: 22 MMOL/L (ref 20–33)
CREAT SERPL-MCNC: 0.85 MG/DL (ref 0.5–1.4)
GFR SERPL CREATININE-BSD FRML MDRD: >60 ML/MIN/1.73 M 2
GLOBULIN SER CALC-MCNC: 2.8 G/DL (ref 1.9–3.5)
GLUCOSE SERPL-MCNC: 95 MG/DL (ref 65–99)
HDLC SERPL-MCNC: 60 MG/DL
LDLC SERPL CALC-MCNC: 86 MG/DL
POTASSIUM SERPL-SCNC: 4.1 MMOL/L (ref 3.6–5.5)
PROT SERPL-MCNC: 7 G/DL (ref 6–8.2)
SODIUM SERPL-SCNC: 136 MMOL/L (ref 135–145)
TRIGL SERPL-MCNC: 168 MG/DL (ref 0–149)

## 2017-10-25 PROCEDURE — 36415 COLL VENOUS BLD VENIPUNCTURE: CPT

## 2017-10-25 PROCEDURE — 80061 LIPID PANEL: CPT

## 2017-10-25 PROCEDURE — 80053 COMPREHEN METABOLIC PANEL: CPT

## 2017-10-30 ENCOUNTER — OFFICE VISIT (OUTPATIENT)
Dept: CARDIOLOGY | Facility: MEDICAL CENTER | Age: 67
End: 2017-10-30
Payer: MEDICARE

## 2017-10-30 VITALS
WEIGHT: 187 LBS | DIASTOLIC BLOOD PRESSURE: 60 MMHG | HEIGHT: 64 IN | OXYGEN SATURATION: 93 % | SYSTOLIC BLOOD PRESSURE: 136 MMHG | BODY MASS INDEX: 31.92 KG/M2 | HEART RATE: 70 BPM

## 2017-10-30 DIAGNOSIS — I10 ESSENTIAL HYPERTENSION: ICD-10-CM

## 2017-10-30 DIAGNOSIS — I31.39 PERICARDIAL EFFUSION: ICD-10-CM

## 2017-10-30 DIAGNOSIS — I34.0 NON-RHEUMATIC MITRAL REGURGITATION: ICD-10-CM

## 2017-10-30 DIAGNOSIS — E78.5 DYSLIPIDEMIA: ICD-10-CM

## 2017-10-30 PROCEDURE — 99214 OFFICE O/P EST MOD 30 MIN: CPT | Performed by: INTERNAL MEDICINE

## 2017-10-30 ASSESSMENT — ENCOUNTER SYMPTOMS
NERVOUS/ANXIOUS: 0
SHORTNESS OF BREATH: 0
LOSS OF CONSCIOUSNESS: 0
CLAUDICATION: 0
CHILLS: 0
ORTHOPNEA: 0
BLURRED VISION: 0
MYALGIAS: 0
SPEECH CHANGE: 0
PND: 0
BRUISES/BLEEDS EASILY: 0
DEPRESSION: 0
FEVER: 0
COUGH: 0
ABDOMINAL PAIN: 0
PALPITATIONS: 0

## 2017-12-03 DIAGNOSIS — I10 ESSENTIAL HYPERTENSION: ICD-10-CM

## 2017-12-04 RX ORDER — LISINOPRIL 40 MG/1
40 TABLET ORAL DAILY
Qty: 90 TAB | Refills: 3 | OUTPATIENT
Start: 2017-12-04 | End: 2018-11-21 | Stop reason: SDUPTHER

## 2017-12-27 DIAGNOSIS — I10 ESSENTIAL HYPERTENSION: ICD-10-CM

## 2017-12-27 DIAGNOSIS — E78.5 DYSLIPIDEMIA: ICD-10-CM

## 2017-12-27 RX ORDER — PRAVASTATIN SODIUM 20 MG
20 TABLET ORAL
Qty: 90 TAB | Refills: 3 | OUTPATIENT
Start: 2017-12-27 | End: 2018-11-28 | Stop reason: SDUPTHER

## 2017-12-27 RX ORDER — AMLODIPINE BESYLATE 10 MG/1
10 TABLET ORAL DAILY
Qty: 90 TAB | Refills: 3 | OUTPATIENT
Start: 2017-12-27 | End: 2018-11-28 | Stop reason: SDUPTHER

## 2018-05-16 ENCOUNTER — HOSPITAL ENCOUNTER (OUTPATIENT)
Dept: LAB | Facility: MEDICAL CENTER | Age: 68
End: 2018-05-16
Attending: INTERNAL MEDICINE
Payer: MEDICARE

## 2018-05-16 DIAGNOSIS — E78.5 HYPERLIPIDEMIA, UNSPECIFIED HYPERLIPIDEMIA TYPE: ICD-10-CM

## 2018-05-16 DIAGNOSIS — I10 ESSENTIAL HYPERTENSION: ICD-10-CM

## 2018-05-16 LAB
ALBUMIN SERPL BCP-MCNC: 4.4 G/DL (ref 3.2–4.9)
ALBUMIN/GLOB SERPL: 1.6 G/DL
ALP SERPL-CCNC: 70 U/L (ref 30–99)
ALT SERPL-CCNC: 22 U/L (ref 2–50)
ANION GAP SERPL CALC-SCNC: 6 MMOL/L (ref 0–11.9)
AST SERPL-CCNC: 21 U/L (ref 12–45)
BASOPHILS # BLD AUTO: 1.2 % (ref 0–1.8)
BASOPHILS # BLD: 0.08 K/UL (ref 0–0.12)
BILIRUB SERPL-MCNC: 0.6 MG/DL (ref 0.1–1.5)
BUN SERPL-MCNC: 14 MG/DL (ref 8–22)
CALCIUM SERPL-MCNC: 9.5 MG/DL (ref 8.5–10.5)
CHLORIDE SERPL-SCNC: 104 MMOL/L (ref 96–112)
CHOLEST SERPL-MCNC: 197 MG/DL (ref 100–199)
CO2 SERPL-SCNC: 29 MMOL/L (ref 20–33)
CREAT SERPL-MCNC: 0.88 MG/DL (ref 0.5–1.4)
EOSINOPHIL # BLD AUTO: 0.25 K/UL (ref 0–0.51)
EOSINOPHIL NFR BLD: 3.8 % (ref 0–6.9)
ERYTHROCYTE [DISTWIDTH] IN BLOOD BY AUTOMATED COUNT: 43 FL (ref 35.9–50)
GLOBULIN SER CALC-MCNC: 2.7 G/DL (ref 1.9–3.5)
GLUCOSE SERPL-MCNC: 95 MG/DL (ref 65–99)
HCT VFR BLD AUTO: 43.7 % (ref 37–47)
HDLC SERPL-MCNC: 68 MG/DL
HGB BLD-MCNC: 14.5 G/DL (ref 12–16)
IMM GRANULOCYTES # BLD AUTO: 0.01 K/UL (ref 0–0.11)
IMM GRANULOCYTES NFR BLD AUTO: 0.2 % (ref 0–0.9)
LDLC SERPL CALC-MCNC: 86 MG/DL
LYMPHOCYTES # BLD AUTO: 3.13 K/UL (ref 1–4.8)
LYMPHOCYTES NFR BLD: 47.6 % (ref 22–41)
MCH RBC QN AUTO: 28.7 PG (ref 27–33)
MCHC RBC AUTO-ENTMCNC: 33.2 G/DL (ref 33.6–35)
MCV RBC AUTO: 86.5 FL (ref 81.4–97.8)
MONOCYTES # BLD AUTO: 0.52 K/UL (ref 0–0.85)
MONOCYTES NFR BLD AUTO: 7.9 % (ref 0–13.4)
NEUTROPHILS # BLD AUTO: 2.59 K/UL (ref 2–7.15)
NEUTROPHILS NFR BLD: 39.3 % (ref 44–72)
NRBC # BLD AUTO: 0 K/UL
NRBC BLD-RTO: 0 /100 WBC
PLATELET # BLD AUTO: 245 K/UL (ref 164–446)
PMV BLD AUTO: 11.4 FL (ref 9–12.9)
POTASSIUM SERPL-SCNC: 4.4 MMOL/L (ref 3.6–5.5)
PROT SERPL-MCNC: 7.1 G/DL (ref 6–8.2)
RBC # BLD AUTO: 5.05 M/UL (ref 4.2–5.4)
SODIUM SERPL-SCNC: 139 MMOL/L (ref 135–145)
TRIGL SERPL-MCNC: 214 MG/DL (ref 0–149)
WBC # BLD AUTO: 6.6 K/UL (ref 4.8–10.8)

## 2018-05-16 PROCEDURE — 80053 COMPREHEN METABOLIC PANEL: CPT

## 2018-05-16 PROCEDURE — 85025 COMPLETE CBC W/AUTO DIFF WBC: CPT

## 2018-05-16 PROCEDURE — 80061 LIPID PANEL: CPT

## 2018-05-16 PROCEDURE — 36415 COLL VENOUS BLD VENIPUNCTURE: CPT

## 2018-10-16 ENCOUNTER — HOSPITAL ENCOUNTER (OUTPATIENT)
Dept: CARDIOLOGY | Facility: MEDICAL CENTER | Age: 68
End: 2018-10-16
Attending: INTERNAL MEDICINE
Payer: MEDICARE

## 2018-10-16 DIAGNOSIS — I34.0 NON-RHEUMATIC MITRAL REGURGITATION: ICD-10-CM

## 2018-10-16 DIAGNOSIS — I31.39 PERICARDIAL EFFUSION: ICD-10-CM

## 2018-10-16 LAB
LV EJECT FRACT  99904: 65
LV EJECT FRACT MOD 2C 99903: 46.85
LV EJECT FRACT MOD 4C 99902: 69.61
LV EJECT FRACT MOD BP 99901: 64.46

## 2018-10-16 PROCEDURE — 93306 TTE W/DOPPLER COMPLETE: CPT

## 2018-10-16 PROCEDURE — 93306 TTE W/DOPPLER COMPLETE: CPT | Mod: 26 | Performed by: INTERNAL MEDICINE

## 2018-10-22 ENCOUNTER — HOSPITAL ENCOUNTER (OUTPATIENT)
Dept: RADIOLOGY | Facility: MEDICAL CENTER | Age: 68
End: 2018-10-22
Attending: OBSTETRICS & GYNECOLOGY
Payer: MEDICARE

## 2018-10-22 DIAGNOSIS — Z12.39 SCREENING BREAST EXAMINATION: ICD-10-CM

## 2018-10-22 PROCEDURE — 77067 SCR MAMMO BI INCL CAD: CPT

## 2018-11-21 DIAGNOSIS — I10 ESSENTIAL HYPERTENSION: ICD-10-CM

## 2018-11-21 RX ORDER — LISINOPRIL 40 MG/1
40 TABLET ORAL DAILY
Qty: 90 TAB | Refills: 0 | Status: SHIPPED | OUTPATIENT
Start: 2018-11-21 | End: 2018-11-28 | Stop reason: SDUPTHER

## 2018-11-28 ENCOUNTER — OFFICE VISIT (OUTPATIENT)
Dept: CARDIOLOGY | Facility: MEDICAL CENTER | Age: 68
End: 2018-11-28
Payer: MEDICARE

## 2018-11-28 VITALS
BODY MASS INDEX: 31.24 KG/M2 | SYSTOLIC BLOOD PRESSURE: 149 MMHG | WEIGHT: 183 LBS | OXYGEN SATURATION: 97 % | HEART RATE: 68 BPM | HEIGHT: 64 IN | DIASTOLIC BLOOD PRESSURE: 58 MMHG

## 2018-11-28 DIAGNOSIS — I31.39 PERICARDIAL EFFUSION: ICD-10-CM

## 2018-11-28 DIAGNOSIS — E78.5 DYSLIPIDEMIA: ICD-10-CM

## 2018-11-28 DIAGNOSIS — I10 ESSENTIAL HYPERTENSION: ICD-10-CM

## 2018-11-28 DIAGNOSIS — I34.0 NON-RHEUMATIC MITRAL REGURGITATION: ICD-10-CM

## 2018-11-28 PROBLEM — R94.4 DECREASED GFR: Status: RESOLVED | Noted: 2017-02-08 | Resolved: 2018-11-28

## 2018-11-28 PROBLEM — R42 VERTIGO: Status: RESOLVED | Noted: 2017-05-24 | Resolved: 2018-11-28

## 2018-11-28 PROCEDURE — 99214 OFFICE O/P EST MOD 30 MIN: CPT | Performed by: INTERNAL MEDICINE

## 2018-11-28 RX ORDER — AMLODIPINE BESYLATE 10 MG/1
10 TABLET ORAL DAILY
Qty: 90 TAB | Refills: 3 | Status: SHIPPED | OUTPATIENT
Start: 2018-11-28 | End: 2020-01-28

## 2018-11-28 RX ORDER — LISINOPRIL 40 MG/1
40 TABLET ORAL DAILY
Qty: 90 TAB | Refills: 0 | Status: SHIPPED | OUTPATIENT
Start: 2018-11-28 | End: 2019-05-30 | Stop reason: SDUPTHER

## 2018-11-28 RX ORDER — PRAVASTATIN SODIUM 20 MG
20 TABLET ORAL
Qty: 90 TAB | Refills: 3 | Status: SHIPPED | OUTPATIENT
Start: 2018-11-28 | End: 2020-01-07

## 2018-11-28 ASSESSMENT — ENCOUNTER SYMPTOMS
BRUISES/BLEEDS EASILY: 0
PALPITATIONS: 0
LOSS OF CONSCIOUSNESS: 0
ORTHOPNEA: 0
DOUBLE VISION: 0
MYALGIAS: 0
FEVER: 0
DIZZINESS: 0
CHILLS: 0
BLURRED VISION: 0
ABDOMINAL PAIN: 0
COUGH: 0
NAUSEA: 0
SHORTNESS OF BREATH: 0
PND: 0
HEADACHES: 0

## 2018-11-29 NOTE — PROGRESS NOTES
Chief Complaint   Patient presents with   • HTN (Controlled)     follow up       Subjective:   Karolina Gilmore is a 68 y.o. female who presents today in follow-up in regards to her known small pericardial effusion in the setting of hypertension and hyperlipidemia    Primary cardiologist retired  Doing well retired teacher in [employer] very healthy and active some ankle swelling but not bothersome blood pressure excellent at home    Past Medical History:   Diagnosis Date   • Cerumen debris on tympanic membrane of right ear 5/24/2016   • GERD (gastroesophageal reflux disease)    • WEBBER (nonalcoholic steatohepatitis)    • Vertigo 5/24/2017     Past Surgical History:   Procedure Laterality Date   • HYSTERECTOMY, TOTAL ABDOMINAL  96   • CARPAL TUNNEL RELEASE      right     Family History   Problem Relation Age of Onset   • Heart Attack Mother 76   • Stroke Mother    • Other Father 52        Brain tumour      Social History     Social History   • Marital status:      Spouse name: N/A   • Number of children: N/A   • Years of education: N/A     Occupational History   • Not on file.     Social History Main Topics   • Smoking status: Never Smoker   • Smokeless tobacco: Never Used   • Alcohol use No   • Drug use: No   • Sexual activity: No     Other Topics Concern   • Not on file     Social History Narrative   • No narrative on file     Allergies   Allergen Reactions   • Augmentin    • Ciprofloxacin    • Neomycin      Outpatient Encounter Prescriptions as of 11/28/2018   Medication Sig Dispense Refill   • Calcium Carbonate-Vitamin D (CALCIUM 500/D PO) Take  by mouth.     • amLODIPine (NORVASC) 10 MG Tab Take 1 Tab by mouth every day. 90 Tab 3   • lisinopril (PRINIVIL, ZESTRIL) 40 MG tablet Take 1 Tab by mouth every day. 90 Tab 0   • pravastatin (PRAVACHOL) 20 MG Tab Take 1 Tab by mouth every bedtime. 90 Tab 3   • estradiol (ESTRACE) 0.5 MG tablet Take 0.5 mg by mouth every day.     • aspirin EC (ECOTRIN) 81 MG  "Tablet Delayed Response Take 81 mg by mouth every day.     • pantoprazole (PROTONIX) 40 MG TBEC Take 40 mg by mouth every day.     • Probiotic Product (PROBIOTIC DAILY PO) Take  by mouth.     • Multiple Vitamins-Minerals (MULTIVITAMIN PO) Take  by mouth.     • vitamin D (CHOLECALCIFEROL) 1000 UNIT TABS Take 1,000 Units by mouth every day.     • docosahexanoic acid (OMEGA 3 FA) 1000 MG CAPS Take 1,000 mg by mouth every day.     • [DISCONTINUED] lisinopril (PRINIVIL, ZESTRIL) 40 MG tablet Take 1 Tab by mouth every day. For further refills please contact new cardiologist. Thank you 90 Tab 0   • [DISCONTINUED] amlodipine (NORVASC) 10 MG Tab Take 1 Tab by mouth every day. 90 Tab 3   • [DISCONTINUED] pravastatin (PRAVACHOL) 20 MG Tab Take 1 Tab by mouth every bedtime. 90 Tab 3   • [DISCONTINUED] meclizine (ANTIVERT) 12.5 MG Tab TAKE ONE TABLET BY MOUTH THREE TIMES DAILY AS NEEDED (Patient not taking: Reported on 11/28/2018) 30 Tab 2     No facility-administered encounter medications on file as of 11/28/2018.      Review of Systems   Constitutional: Negative for chills and fever.   HENT: Negative for congestion.    Eyes: Negative for blurred vision and double vision.   Respiratory: Negative for cough and shortness of breath.    Cardiovascular: Positive for leg swelling. Negative for chest pain, palpitations, orthopnea and PND.   Gastrointestinal: Negative for abdominal pain and nausea.   Musculoskeletal: Negative for myalgias.   Skin: Negative for rash.   Neurological: Negative for dizziness, loss of consciousness and headaches.   Endo/Heme/Allergies: Does not bruise/bleed easily.   All other systems reviewed and are negative.       Objective:   /58 (BP Location: Left arm, Patient Position: Sitting)   Pulse 68   Ht 1.626 m (5' 4\")   Wt 83 kg (183 lb)   SpO2 97%   BMI 31.41 kg/m²     Physical Exam   Constitutional: She is oriented to person, place, and time. She appears well-developed and well-nourished.   HENT: "   Head: Normocephalic and atraumatic.   Eyes: Pupils are equal, round, and reactive to light. EOM are normal.   Neck: No JVD present. No thyromegaly present.   Cardiovascular: Normal rate, regular rhythm and intact distal pulses.    No murmur heard.  Pulmonary/Chest: Breath sounds normal. No respiratory distress.   Abdominal: Bowel sounds are normal. She exhibits no distension.   Musculoskeletal: She exhibits edema (Trace nonpitting bilaterally no stasis changes).   Lymphadenopathy:     She has no cervical adenopathy.   Neurological: She is alert and oriented to person, place, and time. She exhibits normal muscle tone. Coordination normal.       Assessment:     1. Essential hypertension  amLODIPine (NORVASC) 10 MG Tab    lisinopril (PRINIVIL, ZESTRIL) 40 MG tablet   2. Dyslipidemia  pravastatin (PRAVACHOL) 20 MG Tab   3. Pericardial effusion     4. Non-rheumatic mitral regurgitation         Medical Decision Making:  Today's Assessment / Status / Plan:     Spent more than 30 minutes going over her extensive chart, talked at length about her history we looked at images of her echocardiogram together    Pericardial effusion  Talked about physiology, not pathologic  Follow-up with biannual echoes no changes in years  No evidence of underlying rheumatic disease  Labs reviewed as well    Hypertension  Doing well on current medical regimen  Ankle swelling likely stasis compounded by amlodipine  Okay to switch to diuretic if bothersome  Discussed ankle stockings    Hyperlipidemia  Follows with PCP  Labs reviewed next    Talked about progression and causes RTC one year sooner with concerns

## 2019-02-13 ENCOUNTER — OFFICE VISIT (OUTPATIENT)
Dept: MEDICAL GROUP | Facility: PHYSICIAN GROUP | Age: 69
End: 2019-02-13
Payer: MEDICARE

## 2019-02-13 VITALS
WEIGHT: 182 LBS | DIASTOLIC BLOOD PRESSURE: 70 MMHG | TEMPERATURE: 98.4 F | OXYGEN SATURATION: 98 % | SYSTOLIC BLOOD PRESSURE: 140 MMHG | HEART RATE: 80 BPM | HEIGHT: 64 IN | RESPIRATION RATE: 18 BRPM | BODY MASS INDEX: 31.07 KG/M2

## 2019-02-13 DIAGNOSIS — E78.5 DYSLIPIDEMIA: ICD-10-CM

## 2019-02-13 DIAGNOSIS — F41.9 ANXIETY: ICD-10-CM

## 2019-02-13 DIAGNOSIS — K59.1 FUNCTIONAL DIARRHEA: ICD-10-CM

## 2019-02-13 DIAGNOSIS — G24.3 CERVICAL DYSTONIA: ICD-10-CM

## 2019-02-13 DIAGNOSIS — R21 RASH AND NONSPECIFIC SKIN ERUPTION: ICD-10-CM

## 2019-02-13 PROCEDURE — 99214 OFFICE O/P EST MOD 30 MIN: CPT | Performed by: INTERNAL MEDICINE

## 2019-02-13 RX ORDER — CLOTRIMAZOLE 1 %
CREAM (GRAM) TOPICAL
Qty: 1 TUBE | Refills: 1 | Status: SHIPPED
Start: 2019-02-13 | End: 2020-01-22

## 2019-02-13 RX ORDER — VENLAFAXINE HYDROCHLORIDE 37.5 MG/1
37.5 CAPSULE, EXTENDED RELEASE ORAL DAILY
Qty: 30 CAP | Refills: 3 | Status: SHIPPED | OUTPATIENT
Start: 2019-02-13 | End: 2019-05-09

## 2019-02-13 ASSESSMENT — PATIENT HEALTH QUESTIONNAIRE - PHQ9: CLINICAL INTERPRETATION OF PHQ2 SCORE: 0

## 2019-02-13 NOTE — PROGRESS NOTES
Chief Complaint   Patient presents with   • Other     red marks on chest and under arms    • Diarrhea     diarrhea caused from stress and certain foods        HISTORY OF PRESENT ILLNESS: Patient is a 68 y.o. female established patient who presents today to discuss the medical issues below.    Cervical dystonia  Dx with neurology, after physical therapy, patient states she is much better, she has rare wobbly sensation but no jerking.      Functional diarrhea  Patient reports to me for the first time, diarrhea with anxiety.  She is following with GI, she reports she is able to identify anxiety related.  She does know some food irritation but is avoiding this.  She has had trial of hyoscyamine 0.125 mg prn.  Dr Jaffe.      Anxiety  Patient clear that anxiety relates to the diarrhea.  No specific crisis, has done this all her life, finds it is worse, she has insight that stress increases this and that is worse in the last several years.  She has family but it is spread out over the states, limited friend support as most are elderly with medical problems.      Rash and nonspecific skin eruption  Patient noting rash at the anterior chest below her arms and along the bra line for the last several months.  No itching no burning no significant change recently.  She has no fevers chills night sweats weight change.      Patient Active Problem List    Diagnosis Date Noted   • Cervical dystonia 02/13/2019   • Functional diarrhea 02/13/2019   • Anxiety 02/13/2019   • Rash and nonspecific skin eruption 02/13/2019   • Mitral regurgitation 11/07/2016   • Hypertension 04/17/2014   • Pericardial effusion 04/17/2014   • Dyslipidemia 04/17/2014   • GERD (gastroesophageal reflux disease) 04/17/2014       Allergies:Augmentin; Ciprofloxacin; and Neomycin    Current Outpatient Prescriptions   Medication Sig Dispense Refill   • venlafaxine XR (EFFEXOR XR) 37.5 MG CAPSULE SR 24 HR Take 1 Cap by mouth every day. 30 Cap 3   •  clotrimazole (LOTRIMIN) 1 % Cream Apply bid to rash 1 Tube 1   • Calcium Carbonate-Vitamin D (CALCIUM 500/D PO) Take  by mouth.     • amLODIPine (NORVASC) 10 MG Tab Take 1 Tab by mouth every day. 90 Tab 3   • lisinopril (PRINIVIL, ZESTRIL) 40 MG tablet Take 1 Tab by mouth every day. 90 Tab 0   • pravastatin (PRAVACHOL) 20 MG Tab Take 1 Tab by mouth every bedtime. 90 Tab 3   • estradiol (ESTRACE) 0.5 MG tablet Take 0.5 mg by mouth every day.     • aspirin EC (ECOTRIN) 81 MG Tablet Delayed Response Take 81 mg by mouth every day.     • pantoprazole (PROTONIX) 40 MG TBEC Take 40 mg by mouth every day.     • Probiotic Product (PROBIOTIC DAILY PO) Take  by mouth.     • Multiple Vitamins-Minerals (MULTIVITAMIN PO) Take  by mouth.     • vitamin D (CHOLECALCIFEROL) 1000 UNIT TABS Take 1,000 Units by mouth every day.     • docosahexanoic acid (OMEGA 3 FA) 1000 MG CAPS Take 1,000 mg by mouth every day.       No current facility-administered medications for this visit.          Past Medical History:   Diagnosis Date   • Cerumen debris on tympanic membrane of right ear 2016   • GERD (gastroesophageal reflux disease)    • WEBBER (nonalcoholic steatohepatitis)    • Vertigo 2017       Social History   Substance Use Topics   • Smoking status: Never Smoker   • Smokeless tobacco: Never Used   • Alcohol use No       Family Status   Relation Status   • Mo    • Fa    • Sis  at age 63        dementia     Family History   Problem Relation Age of Onset   • Heart Attack Mother 76   • Stroke Mother    • Other Father 52        Brain tumour        ROS:    Respiratory: Negative for cough, sputum production, shortness of breath or wheezing.    Cardiovascular: Negative for chest pain, palpitations, orthopnea, dyspnea with exertion or edema.   Gastrointestinal: Negative for GI upset, nausea, vomiting, abdominal pain, constipation.   Genitourinary: Negative for dysuria, urgency, hesitancy or frequency.       Exam:   "  Blood pressure 140/70, pulse 80, temperature 36.9 °C (98.4 °F), temperature source Temporal, resp. rate 18, height 1.626 m (5' 4\"), weight 82.6 kg (182 lb), SpO2 98 %.  General:  Well nourished, well developed female in NAD.  Skin: Slight macular linear erythema, no vesicles, localized to the axilla bilaterally chest below the breast.  No rash along the back or abdomen.  Pulmonary: Clear to ausculation and percussion.  Normal effort. No rales, rhonchi, or wheezing.  Cardiovascular: Regular rate and rhythm without murmur.   Abdomen: Normal bowel sounds soft and nontender no palpable liver spleen bladder mass.  Extremities: No LE edema noted.  Neuro: Grossly nonfocal.  Psych: Alert and oriented to person, place, and time. Appropriate mood and conversation.    Reviewed labs previously done 2018.    This dictation was created using voice recognition software. I have made reasonable attempts to correct errors, however, errors of grammar and content may exist.          Assessment/Plan:    1. Cervical dystonia  Much improved with conservative management    2. Functional diarrhea  Has had GI workup per patient.  Most clinically consistent with IBS.  Long discussion held as discussed below under anxiety monitor  - Comp Metabolic Panel; Future  - CBC WITH DIFFERENTIAL; Future  - TSH WITH REFLEX TO FT4; Future    3. Anxiety  Long discussion held.  Options discussed.  She agrees to clinical counseling.  As well as starting venlafaxine low-dose.  Career tracks option discussed as well.  Support monitor recheck in 6-8 weeks with lab assessment including thyroid  - REFERRAL TO BEHAVIORAL HEALTH    4. Dyslipidemia  Has done well with Pravachol check labs at follow-up  - Lipid Profile; Future    5. Rash and nonspecific skin eruption  More consistent with low-grade fungal infection along sweat lines.  Monitor with topical antifungal.    Patient was seen for 25 minutes face to face of which more than 50% of the time was spent in " counseling and coordination of care regarding the above problems.

## 2019-02-13 NOTE — ASSESSMENT & PLAN NOTE
Patient noting rash at the anterior chest below her arms and along the bra line for the last several months.  No itching no burning no significant change recently.  She has no fevers chills night sweats weight change.

## 2019-02-13 NOTE — ASSESSMENT & PLAN NOTE
Patient reports to me for the first time, diarrhea with anxiety.  She is following with GI, she reports she is able to identify anxiety related.  She does know some food irritation but is avoiding this.  She has had trial of hyoscyamine 0.125 mg prn.  Dr Jaffe.

## 2019-02-13 NOTE — ASSESSMENT & PLAN NOTE
Patient clear that anxiety relates to the diarrhea.  No specific crisis, has done this all her life, finds it is worse, she has insight that stress increases this and that is worse in the last several years.  She has family but it is spread out over the states, limited friend support as most are elderly with medical problems.

## 2019-02-13 NOTE — ASSESSMENT & PLAN NOTE
Dx with neurology, after physical therapy, patient states she is much better, she has rare wobbly sensation but no jerking.

## 2019-03-02 ENCOUNTER — PATIENT MESSAGE (OUTPATIENT)
Dept: MEDICAL GROUP | Facility: PHYSICIAN GROUP | Age: 69
End: 2019-03-02

## 2019-04-09 ENCOUNTER — HOSPITAL ENCOUNTER (OUTPATIENT)
Facility: MEDICAL CENTER | Age: 69
End: 2019-04-09
Attending: PHYSICIAN ASSISTANT
Payer: MEDICARE

## 2019-04-09 ENCOUNTER — OFFICE VISIT (OUTPATIENT)
Dept: URGENT CARE | Facility: PHYSICIAN GROUP | Age: 69
End: 2019-04-09
Payer: MEDICARE

## 2019-04-09 VITALS
SYSTOLIC BLOOD PRESSURE: 162 MMHG | WEIGHT: 180 LBS | BODY MASS INDEX: 30.73 KG/M2 | OXYGEN SATURATION: 95 % | DIASTOLIC BLOOD PRESSURE: 76 MMHG | HEIGHT: 64 IN | TEMPERATURE: 97.6 F | HEART RATE: 88 BPM | RESPIRATION RATE: 16 BRPM

## 2019-04-09 DIAGNOSIS — R39.9 URINARY SYMPTOM OR SIGN: ICD-10-CM

## 2019-04-09 DIAGNOSIS — R06.2 WHEEZE: ICD-10-CM

## 2019-04-09 DIAGNOSIS — J01.90 ACUTE BACTERIAL SINUSITIS: ICD-10-CM

## 2019-04-09 DIAGNOSIS — B96.89 ACUTE BACTERIAL SINUSITIS: ICD-10-CM

## 2019-04-09 DIAGNOSIS — H66.92 LEFT OTITIS MEDIA, UNSPECIFIED OTITIS MEDIA TYPE: ICD-10-CM

## 2019-04-09 LAB
APPEARANCE UR: NORMAL
BILIRUB UR STRIP-MCNC: NORMAL MG/DL
COLOR UR AUTO: NORMAL
GLUCOSE UR STRIP.AUTO-MCNC: NORMAL MG/DL
KETONES UR STRIP.AUTO-MCNC: NORMAL MG/DL
LEUKOCYTE ESTERASE UR QL STRIP.AUTO: NORMAL
NITRITE UR QL STRIP.AUTO: NORMAL
PH UR STRIP.AUTO: 7 [PH] (ref 5–8)
PROT UR QL STRIP: NORMAL MG/DL
RBC UR QL AUTO: NORMAL
SP GR UR STRIP.AUTO: 1.01
UROBILINOGEN UR STRIP-MCNC: NORMAL MG/DL

## 2019-04-09 PROCEDURE — 99214 OFFICE O/P EST MOD 30 MIN: CPT | Performed by: PHYSICIAN ASSISTANT

## 2019-04-09 PROCEDURE — 87086 URINE CULTURE/COLONY COUNT: CPT

## 2019-04-09 PROCEDURE — 81002 URINALYSIS NONAUTO W/O SCOPE: CPT | Performed by: PHYSICIAN ASSISTANT

## 2019-04-09 RX ORDER — ALBUTEROL SULFATE 90 UG/1
2 AEROSOL, METERED RESPIRATORY (INHALATION) EVERY 4 HOURS PRN
Qty: 1 INHALER | Refills: 0 | Status: SHIPPED
Start: 2019-04-09 | End: 2020-01-22

## 2019-04-09 RX ORDER — DOXYCYCLINE HYCLATE 100 MG
100 TABLET ORAL 2 TIMES DAILY
Qty: 20 TAB | Refills: 0 | Status: SHIPPED | OUTPATIENT
Start: 2019-04-09 | End: 2019-04-19

## 2019-04-09 NOTE — PROGRESS NOTES
Chief Complaint   Patient presents with   • Cough   • UTI       HISTORY OF PRESENT ILLNESS: Patient is a 69 y.o. female who presents today because she has several symptoms and concerns.    She recently returned from a trip to [facility] and developed a cough shortly afterwards.  She does report staying with friends that live in the area and they all had upper respiratory illnesses.  This patient has a 2-week history of dry harsh irritating cough, left-sided nasal congestion and left ear pain.  Denies any fevers, chills, nausea, vomiting or diarrhea.  She has been taking over-the-counter medications which help her sleep through the cough, but is concerned because it continues.  She also has urinary incontinence which she describes as an occasional dribble.  She denies any pain.  She is concerned about a urinary tract infection.  Denies any lower abdominal pain.  She recently had a bacterial eye infection which was treated by her optometrist, she had knee pain, for which she received a steroid injection and she attributes her elevated blood pressure to that injection.    Patient Active Problem List    Diagnosis Date Noted   • Cervical dystonia 02/13/2019   • Functional diarrhea 02/13/2019   • Anxiety 02/13/2019   • Rash and nonspecific skin eruption 02/13/2019   • Mitral regurgitation 11/07/2016   • Hypertension 04/17/2014   • Pericardial effusion 04/17/2014   • Dyslipidemia 04/17/2014   • GERD (gastroesophageal reflux disease) 04/17/2014       Allergies:Augmentin; Ciprofloxacin; and Neomycin    Current Outpatient Prescriptions Ordered in [facility]   Medication Sig Dispense Refill   • doxycycline (VIBRAMYCIN) 100 MG Tab Take 1 Tab by mouth 2 times a day for 10 days. 20 Tab 0   • albuterol 108 (90 Base) MCG/ACT Aero Soln inhalation aerosol Inhale 2 Puffs by mouth every four hours as needed. 1 Inhaler 0   • venlafaxine XR (EFFEXOR XR) 37.5 MG CAPSULE SR 24 HR Take 1 Cap by mouth every day. 30 Cap 3   •  clotrimazole (LOTRIMIN) 1 % Cream Apply bid to rash 1 Tube 1   • Calcium Carbonate-Vitamin D (CALCIUM 500/D PO) Take  by mouth.     • amLODIPine (NORVASC) 10 MG Tab Take 1 Tab by mouth every day. 90 Tab 3   • lisinopril (PRINIVIL, ZESTRIL) 40 MG tablet Take 1 Tab by mouth every day. 90 Tab 0   • pravastatin (PRAVACHOL) 20 MG Tab Take 1 Tab by mouth every bedtime. 90 Tab 3   • estradiol (ESTRACE) 0.5 MG tablet Take 0.5 mg by mouth every day.     • aspirin EC (ECOTRIN) 81 MG Tablet Delayed Response Take 81 mg by mouth every day.     • pantoprazole (PROTONIX) 40 MG TBEC Take 40 mg by mouth every day.     • Probiotic Product (PROBIOTIC DAILY PO) Take  by mouth.     • Multiple Vitamins-Minerals (MULTIVITAMIN PO) Take  by mouth.     • vitamin D (CHOLECALCIFEROL) 1000 UNIT TABS Take 1,000 Units by mouth every day.     • docosahexanoic acid (OMEGA 3 FA) 1000 MG CAPS Take 1,000 mg by mouth every day.       No current [facility]-ordered facility-administered medications on file.        Past Medical History:   Diagnosis Date   • Cerumen debris on tympanic membrane of right ear 2016   • GERD (gastroesophageal reflux disease)    • WEBBER (nonalcoholic steatohepatitis)    • Vertigo 2017       Social History   Substance Use Topics   • Smoking status: Never Smoker   • Smokeless tobacco: Never Used   • Alcohol use No       Family Status   Relation Status   • Mo    • Fa    • Sis  at age 63        dementia     Family History   Problem Relation Age of Onset   • Heart Attack Mother 76   • Stroke Mother    • Other Father 52        Brain tumour        ROS:  Review of Systems   Constitutional: Negative for fever, chills, weight loss and malaise/fatigue.   HENT: Positive for left ear pain, no nosebleeds, positive nasal congestion, sore throat and no neck pain.    Eyes: Negative for blurred vision.   Respiratory: Positive for dry cough, no sputum production, shortness of breath and wheezing.    Cardiovascular:  "Negative for chest pain, palpitations, orthopnea and leg swelling.   Gastrointestinal: Negative for heartburn, nausea, vomiting and abdominal pain.   Genitourinary: Negative for dysuria, urgency and frequency.  Positive for urinary incontinence as in HPI    Exam:  BP (!) 162/76 (BP Location: Right arm, Patient Position: Sitting, BP Cuff Size: Adult)   Pulse 88   Temp 36.4 °C (97.6 °F) (Temporal)   Resp 16   Ht 1.626 m (5' 4\")   Wt 81.6 kg (180 lb)   SpO2 95%   General:  Well nourished, well developed female in NAD  Head:Normocephalic, atraumatic  Eyes: PERRLA, EOM within normal limits, no conjunctival injection, no scleral icterus, visual fields and acuity grossly intact.  Ears: Normal shape and symmetry, no tenderness, no discharge. External canals are without any significant edema or erythema. Tympanic membrane on the left is erythematous, bulging, landmarks are not visible, tympanic membrane on the right is without any inflammation, no effusion. Gross auditory acuity is intact  Nose: Symmetrical without tenderness, no discharge.  Nasal mucosa on the left is erythematous and edematous, there is posterior nasal cavity exudate  Mouth: reasonable hygiene, no erythema exudates or tonsillar enlargement.  Neck: no masses, range of motion within normal limits, no tracheal deviation. No obvious thyroid enlargement.  Pulmonary: chest is symmetrical with respiration, rare wheeze, no rales or rhonchi, bronchial bilaterally cardiovascular: regular rate and rhythm without murmurs, rubs, or gallops.  Extremities: no clubbing, cyanosis, or edema.    Urinalysis shows trace blood, otherwise unremarkable.    Please note that this dictation was created using voice recognition software. I have made every reasonable attempt to correct obvious errors, but I expect that there are errors of grammar and possibly content that I did not discover before finalizing the note.    Assessment/Plan:  1. Urinary symptom or sign  POCT " Urinalysis    URINE CULTURE   2. Acute bacterial sinusitis  doxycycline (VIBRAMYCIN) 100 MG Tab   3. Left otitis media, unspecified otitis media type     4. Wheeze  albuterol 108 (90 Base) MCG/ACT Aero Soln inhalation aerosol   May continue over-the-counter cough and cold medication, I will notify her of her urine culture results.    Followup with primary care in the next 7-10 days if not significantly improving, return to the urgent care or go to the emergency room sooner for any worsening of symptoms.

## 2019-04-11 LAB
BACTERIA UR CULT: NORMAL
SIGNIFICANT IND 70042: NORMAL
SITE SITE: NORMAL
SOURCE SOURCE: NORMAL

## 2019-05-02 ENCOUNTER — HOSPITAL ENCOUNTER (OUTPATIENT)
Dept: LAB | Facility: MEDICAL CENTER | Age: 69
End: 2019-05-02
Attending: INTERNAL MEDICINE
Payer: MEDICARE

## 2019-05-02 DIAGNOSIS — E78.5 DYSLIPIDEMIA: ICD-10-CM

## 2019-05-02 DIAGNOSIS — K59.1 FUNCTIONAL DIARRHEA: ICD-10-CM

## 2019-05-02 LAB
ALBUMIN SERPL BCP-MCNC: 4.3 G/DL (ref 3.2–4.9)
ALBUMIN/GLOB SERPL: 2 G/DL
ALP SERPL-CCNC: 64 U/L (ref 30–99)
ALT SERPL-CCNC: 18 U/L (ref 2–50)
ANION GAP SERPL CALC-SCNC: 8 MMOL/L (ref 0–11.9)
AST SERPL-CCNC: 22 U/L (ref 12–45)
BASOPHILS # BLD AUTO: 1.5 % (ref 0–1.8)
BASOPHILS # BLD: 0.1 K/UL (ref 0–0.12)
BILIRUB SERPL-MCNC: 0.6 MG/DL (ref 0.1–1.5)
BUN SERPL-MCNC: 14 MG/DL (ref 8–22)
CALCIUM SERPL-MCNC: 9.5 MG/DL (ref 8.5–10.5)
CHLORIDE SERPL-SCNC: 106 MMOL/L (ref 96–112)
CHOLEST SERPL-MCNC: 183 MG/DL (ref 100–199)
CO2 SERPL-SCNC: 28 MMOL/L (ref 20–33)
CREAT SERPL-MCNC: 1 MG/DL (ref 0.5–1.4)
EOSINOPHIL # BLD AUTO: 0.47 K/UL (ref 0–0.51)
EOSINOPHIL NFR BLD: 6.9 % (ref 0–6.9)
ERYTHROCYTE [DISTWIDTH] IN BLOOD BY AUTOMATED COUNT: 47.4 FL (ref 35.9–50)
FASTING STATUS PATIENT QL REPORTED: NORMAL
GLOBULIN SER CALC-MCNC: 2.2 G/DL (ref 1.9–3.5)
GLUCOSE SERPL-MCNC: 90 MG/DL (ref 65–99)
HCT VFR BLD AUTO: 44.7 % (ref 37–47)
HDLC SERPL-MCNC: 64 MG/DL
HGB BLD-MCNC: 14.1 G/DL (ref 12–16)
IMM GRANULOCYTES # BLD AUTO: 0.02 K/UL (ref 0–0.11)
IMM GRANULOCYTES NFR BLD AUTO: 0.3 % (ref 0–0.9)
LDLC SERPL CALC-MCNC: 89 MG/DL
LYMPHOCYTES # BLD AUTO: 2.62 K/UL (ref 1–4.8)
LYMPHOCYTES NFR BLD: 38.3 % (ref 22–41)
MCH RBC QN AUTO: 28.1 PG (ref 27–33)
MCHC RBC AUTO-ENTMCNC: 31.5 G/DL (ref 33.6–35)
MCV RBC AUTO: 89 FL (ref 81.4–97.8)
MONOCYTES # BLD AUTO: 0.56 K/UL (ref 0–0.85)
MONOCYTES NFR BLD AUTO: 8.2 % (ref 0–13.4)
NEUTROPHILS # BLD AUTO: 3.07 K/UL (ref 2–7.15)
NEUTROPHILS NFR BLD: 44.8 % (ref 44–72)
NRBC # BLD AUTO: 0 K/UL
NRBC BLD-RTO: 0 /100 WBC
PLATELET # BLD AUTO: 225 K/UL (ref 164–446)
PMV BLD AUTO: 11.5 FL (ref 9–12.9)
POTASSIUM SERPL-SCNC: 4.3 MMOL/L (ref 3.6–5.5)
PROT SERPL-MCNC: 6.5 G/DL (ref 6–8.2)
RBC # BLD AUTO: 5.02 M/UL (ref 4.2–5.4)
SODIUM SERPL-SCNC: 142 MMOL/L (ref 135–145)
TRIGL SERPL-MCNC: 150 MG/DL (ref 0–149)
TSH SERPL DL<=0.005 MIU/L-ACNC: 2.24 UIU/ML (ref 0.38–5.33)
WBC # BLD AUTO: 6.8 K/UL (ref 4.8–10.8)

## 2019-05-02 PROCEDURE — 85025 COMPLETE CBC W/AUTO DIFF WBC: CPT

## 2019-05-02 PROCEDURE — 80053 COMPREHEN METABOLIC PANEL: CPT

## 2019-05-02 PROCEDURE — 80061 LIPID PANEL: CPT

## 2019-05-02 PROCEDURE — 84443 ASSAY THYROID STIM HORMONE: CPT

## 2019-05-02 PROCEDURE — 36415 COLL VENOUS BLD VENIPUNCTURE: CPT

## 2019-05-09 ENCOUNTER — OFFICE VISIT (OUTPATIENT)
Dept: MEDICAL GROUP | Facility: PHYSICIAN GROUP | Age: 69
End: 2019-05-09
Payer: MEDICARE

## 2019-05-09 VITALS
TEMPERATURE: 97.3 F | HEART RATE: 78 BPM | SYSTOLIC BLOOD PRESSURE: 140 MMHG | DIASTOLIC BLOOD PRESSURE: 60 MMHG | HEIGHT: 64 IN | BODY MASS INDEX: 31.41 KG/M2 | WEIGHT: 184 LBS | RESPIRATION RATE: 16 BRPM | OXYGEN SATURATION: 97 %

## 2019-05-09 DIAGNOSIS — K59.1 FUNCTIONAL DIARRHEA: ICD-10-CM

## 2019-05-09 DIAGNOSIS — I10 ESSENTIAL HYPERTENSION: ICD-10-CM

## 2019-05-09 DIAGNOSIS — J30.1 NON-SEASONAL ALLERGIC RHINITIS DUE TO POLLEN: ICD-10-CM

## 2019-05-09 DIAGNOSIS — F41.9 ANXIETY: ICD-10-CM

## 2019-05-09 DIAGNOSIS — M17.0 PRIMARY OSTEOARTHRITIS OF BOTH KNEES: ICD-10-CM

## 2019-05-09 PROCEDURE — 99214 OFFICE O/P EST MOD 30 MIN: CPT | Performed by: INTERNAL MEDICINE

## 2019-05-09 RX ORDER — MONTELUKAST SODIUM 10 MG/1
10 TABLET ORAL DAILY
Qty: 30 TAB | Refills: 3 | Status: SHIPPED
Start: 2019-05-09 | End: 2020-01-22

## 2019-05-09 NOTE — ASSESSMENT & PLAN NOTE
Waxes and wane with anxiety, patient with insight.  Established with GI.  Currently not problematic per patient.

## 2019-05-09 NOTE — PROGRESS NOTES
Chief Complaint   Patient presents with   • Hypertension     lab results    • Otalgia     left ear pain, sore throat [facility] visit        HISTORY OF PRESENT ILLNESS: Patient is a 69 y.o. female established patient who presents today to discuss the medical issues below.    Non-seasonal allergic rhinitis due to pollen  Patient continues with congestion.     Hypertension  Patient has been monitoring at home 120-130/70    Anxiety  Patient states doing better.  She did not like the venlafaxine due to gi so stopped.  Patient has not yet started counseling.      Osteoarthritis of both knees  Patient reports he knee was really painful and locked on her trip.  She has seen Ortho and had injection, improved.  Surgery was discussed with Dr Dumont.      Functional diarrhea  Waxes and wane with anxiety, patient with insight.  Established with GI.  Currently not problematic per patient.      Patient Active Problem List    Diagnosis Date Noted   • Non-seasonal allergic rhinitis due to pollen 05/09/2019   • Osteoarthritis of both knees 05/09/2019   • Cervical dystonia 02/13/2019   • Functional diarrhea 02/13/2019   • Anxiety 02/13/2019   • Rash and nonspecific skin eruption 02/13/2019   • Mitral regurgitation 11/07/2016   • Hypertension 04/17/2014   • Pericardial effusion 04/17/2014   • Dyslipidemia 04/17/2014   • GERD (gastroesophageal reflux disease) 04/17/2014       Allergies:Augmentin; Ciprofloxacin; and Neomycin    Current Outpatient Prescriptions   Medication Sig Dispense Refill   • montelukast (SINGULAIR) 10 MG Tab Take 1 Tab by mouth every day. 30 Tab 3   • albuterol 108 (90 Base) MCG/ACT Aero Soln inhalation aerosol Inhale 2 Puffs by mouth every four hours as needed. 1 Inhaler 0   • clotrimazole (LOTRIMIN) 1 % Cream Apply bid to rash 1 Tube 1   • Calcium Carbonate-Vitamin D (CALCIUM 500/D PO) Take  by mouth.     • amLODIPine (NORVASC) 10 MG Tab Take 1 Tab by mouth every day. 90 Tab 3   • lisinopril (PRINIVIL,  "ZESTRIL) 40 MG tablet Take 1 Tab by mouth every day. 90 Tab 0   • pravastatin (PRAVACHOL) 20 MG Tab Take 1 Tab by mouth every bedtime. 90 Tab 3   • estradiol (ESTRACE) 0.5 MG tablet Take 0.5 mg by mouth every day.     • aspirin EC (ECOTRIN) 81 MG Tablet Delayed Response Take 81 mg by mouth every day.     • pantoprazole (PROTONIX) 40 MG TBEC Take 40 mg by mouth every day.     • Probiotic Product (PROBIOTIC DAILY PO) Take  by mouth.     • Multiple Vitamins-Minerals (MULTIVITAMIN PO) Take  by mouth.     • vitamin D (CHOLECALCIFEROL) 1000 UNIT TABS Take 1,000 Units by mouth every day.     • docosahexanoic acid (OMEGA 3 FA) 1000 MG CAPS Take 1,000 mg by mouth every day.       No current facility-administered medications for this visit.          Past Medical History:   Diagnosis Date   • Cerumen debris on tympanic membrane of right ear 2016   • GERD (gastroesophageal reflux disease)    • WEBBER (nonalcoholic steatohepatitis)    • Vertigo 2017       Social History   Substance Use Topics   • Smoking status: Never Smoker   • Smokeless tobacco: Never Used   • Alcohol use No       Family Status   Relation Status   • Mo    • Fa    • Sis  at age 63        dementia     Family History   Problem Relation Age of Onset   • Heart Attack Mother 76   • Stroke Mother    • Other Father 52        Brain tumour        ROS:    Respiratory: Negative for cough, sputum production, shortness of breath or wheezing.    Cardiovascular: Negative for chest pain, palpitations, orthopnea, dyspnea with exertion or edema.   Gastrointestinal: Negative for GI upset, nausea, vomiting, abdominal pain, constipation or diarrhea.   Genitourinary: Negative for dysuria, urgency, hesitancy or frequency.       Exam:    /60 (BP Location: Left arm, Patient Position: Sitting, BP Cuff Size: Large adult)   Pulse 78   Temp 36.3 °C (97.3 °F) (Temporal)   Resp 16   Ht 1.626 m (5' 4\")   Wt 83.5 kg (184 lb)   SpO2 97%   General:  " Well nourished, well developed female in NAD.  HENT: Bilateral TMs are retracted, nasal mucosa with edema no sinus tenderness to percussion no ulcerations or exudates.  Oral mucosa with no lesions.  Pulmonary: Clear to ausculation and percussion.  Normal effort. No rales, rhonchi, or wheezing.  Cardiovascular: Regular rate and rhythm without murmur.   Abdomen: Normal bowel sounds soft and nontender no palpable liver spleen bladder mass.  Extremities: No LE edema noted.  Neuro: Grossly nonfocal.  Psych: Alert and oriented to person, place, and time. Appropriate mood and conversation.    LABS: Results reviewed and discussed with the patient, questions answered.      This dictation was created using voice recognition software. I have made reasonable attempts to correct errors, however, errors of grammar and content may exist.          Assessment/Plan:    1. Non-seasonal allergic rhinitis due to pollen  Discussed component of allergies.  Pollen overlay however she does have identified food allergies however is not dietarily compliant.  Discussed over-the-counter nonsedating antihistamines, trial Singulair.  Prescription is written.    2. Essential hypertension  Borderline here at home doing well ongoing medications and monitoring.  No secondary symptoms such as chest pain palpitations or edema.    3. Anxiety  Discussed options for telemedicine counseling.  Stress management.  She declines additional medication trials.    4. Primary osteoarthritis of both knees  Has established with Ortho improved anticipates in the future may need surgery    5. Functional diarrhea  Stable per patient       Patient was seen for 25 minutes face to face of which more than 50% of the time was spent in counseling and coordination of care regarding the above problems.

## 2019-05-09 NOTE — ASSESSMENT & PLAN NOTE
Patient reports he knee was really painful and locked on her trip.  She has seen Ortho and had injection, improved.  Surgery was discussed with Dr Dumont.

## 2019-05-09 NOTE — ASSESSMENT & PLAN NOTE
Patient states doing better.  She did not like the venlafaxine due to gi so stopped.  Patient has not yet started counseling.

## 2019-05-30 DIAGNOSIS — I10 ESSENTIAL HYPERTENSION: ICD-10-CM

## 2019-05-31 RX ORDER — LISINOPRIL 40 MG/1
TABLET ORAL
Qty: 90 TAB | Refills: 2 | Status: SHIPPED | OUTPATIENT
Start: 2019-05-31 | End: 2020-03-02 | Stop reason: SDUPTHER

## 2019-10-28 ENCOUNTER — HOSPITAL ENCOUNTER (OUTPATIENT)
Dept: RADIOLOGY | Facility: MEDICAL CENTER | Age: 69
End: 2019-10-28
Attending: INTERNAL MEDICINE
Payer: MEDICARE

## 2019-10-28 DIAGNOSIS — Z12.31 VISIT FOR SCREENING MAMMOGRAM: ICD-10-CM

## 2019-10-28 PROCEDURE — 77063 BREAST TOMOSYNTHESIS BI: CPT

## 2020-01-06 DIAGNOSIS — E78.5 DYSLIPIDEMIA: ICD-10-CM

## 2020-01-07 RX ORDER — PRAVASTATIN SODIUM 20 MG
20 TABLET ORAL
Qty: 90 TAB | Refills: 0 | Status: SHIPPED | OUTPATIENT
Start: 2020-01-07 | End: 2020-04-23 | Stop reason: SDUPTHER

## 2020-01-22 ENCOUNTER — OFFICE VISIT (OUTPATIENT)
Dept: CARDIOLOGY | Facility: MEDICAL CENTER | Age: 70
End: 2020-01-22
Payer: MEDICARE

## 2020-01-22 VITALS
HEART RATE: 70 BPM | WEIGHT: 184 LBS | RESPIRATION RATE: 12 BRPM | BODY MASS INDEX: 31.41 KG/M2 | OXYGEN SATURATION: 98 % | SYSTOLIC BLOOD PRESSURE: 146 MMHG | DIASTOLIC BLOOD PRESSURE: 88 MMHG | HEIGHT: 64 IN

## 2020-01-22 DIAGNOSIS — I34.0 NONRHEUMATIC MITRAL VALVE REGURGITATION: ICD-10-CM

## 2020-01-22 DIAGNOSIS — I10 ESSENTIAL HYPERTENSION: ICD-10-CM

## 2020-01-22 DIAGNOSIS — E78.5 DYSLIPIDEMIA: ICD-10-CM

## 2020-01-22 DIAGNOSIS — Z91.89 10 YEAR RISK OF MI OR STROKE 7.5% OR GREATER: ICD-10-CM

## 2020-01-22 DIAGNOSIS — I31.39 PERICARDIAL EFFUSION: ICD-10-CM

## 2020-01-22 PROBLEM — R21 RASH AND NONSPECIFIC SKIN ERUPTION: Status: RESOLVED | Noted: 2019-02-13 | Resolved: 2020-01-22

## 2020-01-22 LAB — EKG IMPRESSION: NORMAL

## 2020-01-22 PROCEDURE — 93000 ELECTROCARDIOGRAM COMPLETE: CPT | Performed by: INTERNAL MEDICINE

## 2020-01-22 PROCEDURE — 99214 OFFICE O/P EST MOD 30 MIN: CPT | Performed by: INTERNAL MEDICINE

## 2020-01-22 RX ORDER — ESTRADIOL 10 UG/1
10 INSERT VAGINAL
COMMUNITY

## 2020-01-22 RX ORDER — HYDROCHLOROTHIAZIDE 12.5 MG/1
12.5 TABLET ORAL DAILY
Qty: 90 TAB | Refills: 3 | Status: SHIPPED | OUTPATIENT
Start: 2020-01-22 | End: 2021-01-07

## 2020-01-22 ASSESSMENT — ENCOUNTER SYMPTOMS
DYSPNEA ON EXERTION: 0
DEPRESSION: 0
FLANK PAIN: 0
BACK PAIN: 0
ORTHOPNEA: 0
FEVER: 0
WEIGHT GAIN: 0
PND: 0
NAUSEA: 0
ALTERED MENTAL STATUS: 0
DECREASED APPETITE: 0
WEIGHT LOSS: 0
SYNCOPE: 0
SHORTNESS OF BREATH: 0
COUGH: 0
IRREGULAR HEARTBEAT: 0
DIZZINESS: 0
CONSTIPATION: 0
PALPITATIONS: 0
DIARRHEA: 0
VOMITING: 0
ABDOMINAL PAIN: 0
NEAR-SYNCOPE: 0
CLAUDICATION: 0
HEARTBURN: 0
BLURRED VISION: 0

## 2020-01-22 NOTE — PROGRESS NOTES
Cardiology Note    Chief Complaint   Patient presents with   • Hypertension       History of Present Illness: Karolina Gilmoer is a 69 y.o. female PMH HTN, HLD, pericardial effusion who presents for follow up.    Stays active. Limited by knee and hip, so only does stationary bike or exercise class. Pain controlled with injection. Denies cardiac symptoms.    Review of Systems   Constitution: Negative for decreased appetite, fever, malaise/fatigue, weight gain and weight loss.   HENT: Negative for congestion and nosebleeds.    Eyes: Negative for blurred vision.   Cardiovascular: Negative for chest pain, claudication, dyspnea on exertion, irregular heartbeat, leg swelling, near-syncope, orthopnea, palpitations, paroxysmal nocturnal dyspnea and syncope.   Respiratory: Negative for cough and shortness of breath.    Endocrine: Negative for cold intolerance and heat intolerance.   Skin: Negative for rash.   Musculoskeletal: Negative for back pain.   Gastrointestinal: Negative for abdominal pain, constipation, diarrhea, heartburn, melena, nausea and vomiting.   Genitourinary: Negative for dysuria, flank pain and hematuria.   Neurological: Negative for dizziness.   Psychiatric/Behavioral: Negative for altered mental status and depression.         Past Medical History:   Diagnosis Date   • Cerumen debris on tympanic membrane of right ear 5/24/2016   • GERD (gastroesophageal reflux disease)    • WEBBER (nonalcoholic steatohepatitis)    • Vertigo 5/24/2017         Past Surgical History:   Procedure Laterality Date   • HYSTERECTOMY, TOTAL ABDOMINAL  96   • CARPAL TUNNEL RELEASE      right         Current Outpatient Medications   Medication Sig Dispense Refill   • Multiple Vitamins-Minerals (ICAPS AREDS 2 PO)      • estradiol (VAGIFEM) 10 MCG Tab      • hydroCHLOROthiazide (HYDRODIURIL) 12.5 MG tablet Take 1 Tab by mouth every day. 90 Tab 3   • pravastatin (PRAVACHOL) 20 MG Tab Take 1 Tab by mouth every bedtime. For further  refills please contact new cardiologist. Thank you 90 Tab 0   • lisinopril (PRINIVIL, ZESTRIL) 40 MG tablet TAKE ONE TABLET BY MOUTH ONE TIME DAILY  90 Tab 2   • Calcium Carbonate-Vitamin D (CALCIUM 500/D PO) Take  by mouth.     • amLODIPine (NORVASC) 10 MG Tab Take 1 Tab by mouth every day. 90 Tab 3   • aspirin EC (ECOTRIN) 81 MG Tablet Delayed Response Take 81 mg by mouth every day.     • pantoprazole (PROTONIX) 40 MG TBEC Take 40 mg by mouth every day.     • Probiotic Product (PROBIOTIC DAILY PO) Take  by mouth.     • Multiple Vitamins-Minerals (MULTIVITAMIN PO) Take  by mouth.     • vitamin D (CHOLECALCIFEROL) 1000 UNIT TABS Take 1,000 Units by mouth every day.     • montelukast (SINGULAIR) 10 MG Tab Take 1 Tab by mouth every day. 30 Tab 3   • albuterol 108 (90 Base) MCG/ACT Aero Soln inhalation aerosol Inhale 2 Puffs by mouth every four hours as needed. 1 Inhaler 0   • clotrimazole (LOTRIMIN) 1 % Cream Apply bid to rash 1 Tube 1   • estradiol (ESTRACE) 0.5 MG tablet Take 0.5 mg by mouth every day.     • docosahexanoic acid (OMEGA 3 FA) 1000 MG CAPS Take 1,000 mg by mouth every day.       No current facility-administered medications for this visit.          Allergies   Allergen Reactions   • Augmentin    • Ciprofloxacin    • Neomycin          Family History   Problem Relation Age of Onset   • Heart Attack Mother 76   • Stroke Mother    • Other Father 52        Brain tumour          Social History     Socioeconomic History   • Marital status:      Spouse name: Not on file   • Number of children: Not on file   • Years of education: Not on file   • Highest education level: Not on file   Occupational History   • Not on file   Social Needs   • Financial resource strain: Not on file   • Food insecurity:     Worry: Not on file     Inability: Not on file   • Transportation needs:     Medical: Not on file     Non-medical: Not on file   Tobacco Use   • Smoking status: Never Smoker   • Smokeless tobacco: Never Used  "  Substance and Sexual Activity   • Alcohol use: No     Alcohol/week: 0.0 oz   • Drug use: No   • Sexual activity: Never   Lifestyle   • Physical activity:     Days per week: Not on file     Minutes per session: Not on file   • Stress: Not on file   Relationships   • Social connections:     Talks on phone: Not on file     Gets together: Not on file     Attends Sabianist service: Not on file     Active member of club or organization: Not on file     Attends meetings of clubs or organizations: Not on file     Relationship status: Not on file   • Intimate partner violence:     Fear of current or ex partner: Not on file     Emotionally abused: Not on file     Physically abused: Not on file     Forced sexual activity: Not on file   Other Topics Concern   • Not on file   Social History Narrative   • Not on file         Physical Exam:  Ambulatory Vitals  /88 (BP Location: Right arm, Patient Position: Sitting, BP Cuff Size: Adult)   Pulse 70   Resp 12   Ht 1.626 m (5' 4\")   Wt 83.5 kg (184 lb)   SpO2 98%    BP Readings from Last 4 Encounters:   01/22/20 146/88   05/09/19 140/60   04/09/19 (!) 162/76   02/13/19 140/70     Weight/BMI:   Vitals:    01/22/20 1235   BP: 146/88   Weight: 83.5 kg (184 lb)   Height: 1.626 m (5' 4\")    Body mass index is 31.58 kg/m².  Wt Readings from Last 4 Encounters:   01/22/20 83.5 kg (184 lb)   05/09/19 83.5 kg (184 lb)   04/09/19 81.6 kg (180 lb)   02/13/19 82.6 kg (182 lb)       Physical Exam   Constitutional: She is oriented to person, place, and time and well-developed, well-nourished, and in no distress. No distress.   HENT:   Head: Normocephalic and atraumatic.   Eyes: Pupils are equal, round, and reactive to light. Conjunctivae are normal.   Neck: Normal range of motion. Neck supple. No JVD present.   Cardiovascular: Normal rate, regular rhythm, normal heart sounds and intact distal pulses. Exam reveals no gallop and no friction rub.   No murmur heard.  Pulmonary/Chest: Effort " normal and breath sounds normal. No respiratory distress. She has no wheezes. She has no rales. She exhibits no tenderness.   Abdominal: Soft. Bowel sounds are normal. She exhibits no distension.   Musculoskeletal:         General: No edema.   Neurological: She is alert and oriented to person, place, and time.   Skin: Skin is warm and dry.   Psychiatric: Affect and judgment normal.       Lab Data Review:  Lab Results   Component Value Date/Time    CHOLSTRLTOT 183 05/02/2019 10:39 AM    LDL 89 05/02/2019 10:39 AM    HDL 64 05/02/2019 10:39 AM    TRIGLYCERIDE 150 (H) 05/02/2019 10:39 AM       Lab Results   Component Value Date/Time    SODIUM 142 05/02/2019 10:39 AM    POTASSIUM 4.3 05/02/2019 10:39 AM    CHLORIDE 106 05/02/2019 10:39 AM    CO2 28 05/02/2019 10:39 AM    GLUCOSE 90 05/02/2019 10:39 AM    BUN 14 05/02/2019 10:39 AM    CREATININE 1.00 05/02/2019 10:39 AM     CrCl cannot be calculated (Patient's most recent lab result is older than the maximum 7 days allowed.).  Lab Results   Component Value Date/Time    ALKPHOSPHAT 64 05/02/2019 10:39 AM    ASTSGOT 22 05/02/2019 10:39 AM    ALTSGPT 18 05/02/2019 10:39 AM    TBILIRUBIN 0.6 05/02/2019 10:39 AM      Lab Results   Component Value Date/Time    WBC 6.8 05/02/2019 10:39 AM     Lab Results   Component Value Date/Time    HBA1C 5.5 11/06/2013 07:11 AM     No components found for: TROP      Cardiac Imaging and Procedures Review:      EKG 1/22/20 - sinus, first deg AVB, normal qtc    TTE 10/2018  CONCLUSIONS  Normal left ventricular systolic function.  Left ventricular ejection fraction is visually estimated to be 65%.  Mild mitral regurgitation.  Small pericardial effusion without evidence of hemodynamic compromise.  No Doppler evidence of patent foramen ovale.    Medical Decision Making:  Problem List Items Addressed This Visit     Hypertension     Elevated. Goal <130/80. Add hctz.         Relevant Medications    hydroCHLOROthiazide (HYDRODIURIL) 12.5 MG tablet     Other Relevant Orders    EKG (Completed)    EC-ECHOCARDIOGRAM COMPLETE W/O CONT    Pericardial effusion     Asymptomatic. Recheck TTE.         Relevant Medications    hydroCHLOROthiazide (HYDRODIURIL) 12.5 MG tablet    Other Relevant Orders    EC-ECHOCARDIOGRAM COMPLETE W/O CONT    Dyslipidemia     Well controlled. Cont pravastatin.         Relevant Orders    EKG (Completed)    Mitral regurgitation     Recheck TTE.         Relevant Medications    hydroCHLOROthiazide (HYDRODIURIL) 12.5 MG tablet    10 year risk of MI or stroke 7.5% or greater     >10%. Cont aspirin and statin. Goal BP <130/80.               It was my pleasure to meet with Ms. Gilmore.

## 2020-01-22 NOTE — PATIENT INSTRUCTIONS
Mediterranean Diet  A Mediterranean diet refers to food and lifestyle choices that are based on the traditions of countries located on the [place]. This way of eating has been shown to help prevent certain conditions and improve outcomes for people who have chronic diseases, like kidney disease and heart disease.  What are tips for following this plan?  Lifestyle  · Cook and eat meals together with your family, when possible.  · Drink enough fluid to keep your urine clear or pale yellow.  · Be physically active every day. This includes:  ¨ Aerobic exercise like running or swimming.  ¨ Leisure activities like gardening, walking, or housework.  · Get 7-8 hours of sleep each night.  · If recommended by your health care provider, drink red wine in moderation. This means 1 glass a day for nonpregnant women and 2 glasses a day for men. A glass of wine equals 5 oz (150 mL).  Reading food labels  · Check the serving size of packaged foods. For foods such as rice and pasta, the serving size refers to the amount of cooked product, not dry.  · Check the total fat in packaged foods. Avoid foods that have saturated fat or trans fats.  · Check the ingredients list for added sugars, such as corn syrup.  Shopping  · At the grocery store, buy most of your food from the areas near the walls of the store. This includes:  ¨ Fresh fruits and vegetables (produce).  ¨ Grains, beans, nuts, and seeds. Some of these may be available in unpackaged forms or large amounts (in bulk).  ¨ Fresh seafood.  ¨ Poultry and eggs.  ¨ Low-fat dairy products.  · Buy whole ingredients instead of prepackaged foods.  · Buy fresh fruits and vegetables in-season from local farmers markets.  · Buy frozen fruits and vegetables in resealable bags.  · If you do not have access to quality fresh seafood, buy precooked frozen shrimp or canned fish, such as tuna, salmon, or sardines.  · Buy small amounts of raw or cooked vegetables, salads, or olives from the  deli or salad bar at your store.  · Stock your pantry so you always have certain foods on hand, such as olive oil, canned tuna, canned tomatoes, rice, pasta, and beans.  Cooking  · Cook foods with extra-virgin olive oil instead of using butter or other vegetable oils.  · Have meat as a side dish, and have vegetables or grains as your main dish. This means having meat in small portions or adding small amounts of meat to foods like pasta or stew.  · Use beans or vegetables instead of meat in common dishes like chili or lasagna.  · [place] with different cooking methods. Try roasting or broiling vegetables instead of steaming or sautéeing them.  · Add frozen vegetables to soups, stews, pasta, or rice.  · Add nuts or seeds for added healthy fat at each meal. You can add these to yogurt, salads, or vegetable dishes.  · Marinate fish or vegetables using olive oil, lemon juice, garlic, and fresh herbs.  Meal planning  · Plan to eat 1 vegetarian meal one day each week. Try to work up to 2 vegetarian meals, if possible.  · Eat seafood 2 or more times a week.  · Have healthy snacks readily available, such as:  ¨ Vegetable sticks with hummus.  ¨ Greek yogurt.  ¨ Fruit and nut trail mix.  · Eat balanced meals throughout the week. This includes:  ¨ Fruit: 2-3 servings a day  ¨ Vegetables: 4-5 servings a day  ¨ Low-fat dairy: 2 servings a day  ¨ Fish, poultry, or lean meat: 1 serving a day  ¨ Beans and legumes: 2 or more servings a week  ¨ Nuts and seeds: 1-2 servings a day  ¨ Whole grains: 6-8 servings a day  ¨ Extra-virgin olive oil: 3-4 servings a day  · Limit red meat and sweets to only a few servings a month  What are my food choices?  · Mediterranean diet  ¨ Recommended  ¨ Grains: Whole-grain pasta. Brown rice. Bulgar wheat. Polenta. Couscous. Whole-wheat bread. Oatmeal. Quinoa.  ¨ Vegetables: Artichokes. Beets. Broccoli. Cabbage. Carrots. Eggplant. Green beans. Chard. Kale. Spinach. Onions. Leeks. Peas. Squash.  Tomatoes. Peppers. Radishes.  ¨ Fruits: Apples. Apricots. Avocado. Berries. Bananas. Cherries. Dates. Figs. Grapes. Mary. Melon. Oranges. Peaches. Plums. Pomegranate.  ¨ Meats and other protein foods: Beans. Almonds. Sunflower seeds. Pine nuts. Peanuts. Cod. [place]. Scallops. Shrimp. Tuna. Tilapia. Clams. Oysters. Eggs.  ¨ Dairy: Low-fat milk. Cheese. Greek yogurt.  ¨ Beverages: Water. Red wine. Herbal tea.  ¨ Fats and oils: Extra virgin olive oil. Avocado oil. Grape seed oil.  ¨ Sweets and desserts: Greek yogurt with honey. Baked apples. Poached pears. Trail mix.  ¨ Seasoning and other foods: Basil. Cilantro. Coriander. Cumin. Mint. Parsley. Naeem. Rosemary. Tarragon. Garlic. Oregano. Thyme. Pepper. Balsalmic vinegar. Tahini. Hummus. Tomato sauce. Olives. Mushrooms.  ¨ Limit these  ¨ Grains: Prepackaged pasta or rice dishes. Prepackaged cereal with added sugar.  ¨ Vegetables: Deep fried potatoes (french fries).  ¨ Fruits: Fruit canned in syrup.  ¨ Meats and other protein foods: Beef. Pork. Lamb. Poultry with skin. Hot dogs. Cooper.  ¨ Dairy: Ice cream. Sour cream. Whole milk.  ¨ Beverages: Juice. Sugar-sweetened soft drinks. Beer. Liquor and spirits.  ¨ Fats and oils: Butter. Canola oil. Vegetable oil. Beef fat (tallow). Lard.  ¨ Sweets and desserts: Cookies. Cakes. Pies. Candy.  ¨ Seasoning and other foods: Mayonnaise. Premade sauces and marinades.  ¨ The items listed may not be a complete list. Talk with your dietitian about what dietary choices are right for you.  Summary  · The Mediterranean diet includes both food and lifestyle choices.  · Eat a variety of fresh fruits and vegetables, beans, nuts, seeds, and whole grains.  · Limit the amount of red meat and sweets that you eat.  · Talk with your health care provider about whether it is safe for you to drink red wine in moderation. This means 1 glass a day for nonpregnant women and 2 glasses a day for men. A glass of wine equals 5 oz (150 mL).  This information  is not intended to replace advice given to you by your health care provider. Make sure you discuss any questions you have with your health care provider.  Document Released: 08/10/2017 Document Revised: 09/12/2017 Document Reviewed: 08/10/2017  Elsevier Interactive Patient Education © 2017 [employer]

## 2020-01-28 DIAGNOSIS — I10 ESSENTIAL HYPERTENSION: ICD-10-CM

## 2020-01-28 RX ORDER — AMLODIPINE BESYLATE 10 MG/1
10 TABLET ORAL DAILY
Qty: 90 TAB | Refills: 3 | Status: SHIPPED | OUTPATIENT
Start: 2020-01-28 | End: 2021-01-28

## 2020-02-11 ENCOUNTER — HOSPITAL ENCOUNTER (OUTPATIENT)
Dept: CARDIOLOGY | Facility: MEDICAL CENTER | Age: 70
End: 2020-02-11
Attending: INTERNAL MEDICINE
Payer: MEDICARE

## 2020-02-11 DIAGNOSIS — I31.39 PERICARDIAL EFFUSION: ICD-10-CM

## 2020-02-11 DIAGNOSIS — I10 ESSENTIAL HYPERTENSION: ICD-10-CM

## 2020-02-11 LAB
LV EJECT FRACT  99904: 65
LV EJECT FRACT MOD 2C 99903: 52.31
LV EJECT FRACT MOD 4C 99902: 71.37
LV EJECT FRACT MOD BP 99901: 66.25

## 2020-02-11 PROCEDURE — 93306 TTE W/DOPPLER COMPLETE: CPT

## 2020-02-11 PROCEDURE — 93306 TTE W/DOPPLER COMPLETE: CPT | Mod: 26 | Performed by: INTERNAL MEDICINE

## 2020-03-02 DIAGNOSIS — I10 ESSENTIAL HYPERTENSION: ICD-10-CM

## 2020-03-02 RX ORDER — LISINOPRIL 40 MG/1
40 TABLET ORAL DAILY
Qty: 90 TAB | Refills: 3 | Status: SHIPPED | OUTPATIENT
Start: 2020-03-02 | End: 2021-02-05

## 2020-04-23 DIAGNOSIS — E78.5 DYSLIPIDEMIA: ICD-10-CM

## 2020-04-24 RX ORDER — PRAVASTATIN SODIUM 20 MG
20 TABLET ORAL
Qty: 90 TAB | Refills: 3 | Status: SHIPPED | OUTPATIENT
Start: 2020-04-24 | End: 2021-04-20

## 2020-04-29 ENCOUNTER — PATIENT MESSAGE (OUTPATIENT)
Dept: MEDICAL GROUP | Facility: PHYSICIAN GROUP | Age: 70
End: 2020-04-29

## 2020-11-12 ENCOUNTER — HOSPITAL ENCOUNTER (OUTPATIENT)
Dept: RADIOLOGY | Facility: MEDICAL CENTER | Age: 70
End: 2020-11-12
Attending: INTERNAL MEDICINE
Payer: MEDICARE

## 2020-11-12 DIAGNOSIS — Z12.31 VISIT FOR SCREENING MAMMOGRAM: ICD-10-CM

## 2020-11-12 PROCEDURE — 77067 SCR MAMMO BI INCL CAD: CPT

## 2021-01-06 DIAGNOSIS — I10 ESSENTIAL HYPERTENSION: ICD-10-CM

## 2021-01-07 ENCOUNTER — HOSPITAL ENCOUNTER (OUTPATIENT)
Dept: LAB | Facility: MEDICAL CENTER | Age: 71
End: 2021-01-07
Attending: INTERNAL MEDICINE
Payer: MEDICARE

## 2021-01-07 LAB
25(OH)D3 SERPL-MCNC: 42 NG/ML (ref 30–100)
ALBUMIN SERPL BCP-MCNC: 4.5 G/DL (ref 3.2–4.9)
ALBUMIN/GLOB SERPL: 1.6 G/DL
ALP SERPL-CCNC: 88 U/L (ref 30–99)
ALT SERPL-CCNC: 14 U/L (ref 2–50)
ANION GAP SERPL CALC-SCNC: 12 MMOL/L (ref 7–16)
AST SERPL-CCNC: 24 U/L (ref 12–45)
BASOPHILS # BLD AUTO: 1.3 % (ref 0–1.8)
BASOPHILS # BLD: 0.1 K/UL (ref 0–0.12)
BILIRUB SERPL-MCNC: 0.4 MG/DL (ref 0.1–1.5)
BUN SERPL-MCNC: 16 MG/DL (ref 8–22)
CALCIUM SERPL-MCNC: 9.4 MG/DL (ref 8.5–10.5)
CHLORIDE SERPL-SCNC: 101 MMOL/L (ref 96–112)
CHOLEST SERPL-MCNC: 221 MG/DL (ref 100–199)
CO2 SERPL-SCNC: 26 MMOL/L (ref 20–33)
CREAT SERPL-MCNC: 0.9 MG/DL (ref 0.5–1.4)
EOSINOPHIL # BLD AUTO: 0.33 K/UL (ref 0–0.51)
EOSINOPHIL NFR BLD: 4.3 % (ref 0–6.9)
ERYTHROCYTE [DISTWIDTH] IN BLOOD BY AUTOMATED COUNT: 45.1 FL (ref 35.9–50)
FASTING STATUS PATIENT QL REPORTED: NORMAL
GLOBULIN SER CALC-MCNC: 2.8 G/DL (ref 1.9–3.5)
GLUCOSE SERPL-MCNC: 100 MG/DL (ref 65–99)
HCT VFR BLD AUTO: 43.5 % (ref 37–47)
HDLC SERPL-MCNC: 49 MG/DL
HGB BLD-MCNC: 14.1 G/DL (ref 12–16)
IMM GRANULOCYTES # BLD AUTO: 0.02 K/UL (ref 0–0.11)
IMM GRANULOCYTES NFR BLD AUTO: 0.3 % (ref 0–0.9)
IRON SATN MFR SERPL: 24 % (ref 15–55)
IRON SERPL-MCNC: 66 UG/DL (ref 40–170)
LDLC SERPL CALC-MCNC: 95 MG/DL
LYMPHOCYTES # BLD AUTO: 2.9 K/UL (ref 1–4.8)
LYMPHOCYTES NFR BLD: 37.4 % (ref 22–41)
MAGNESIUM SERPL-MCNC: 2.1 MG/DL (ref 1.5–2.5)
MCH RBC QN AUTO: 29.1 PG (ref 27–33)
MCHC RBC AUTO-ENTMCNC: 32.4 G/DL (ref 33.6–35)
MCV RBC AUTO: 89.7 FL (ref 81.4–97.8)
MONOCYTES # BLD AUTO: 0.65 K/UL (ref 0–0.85)
MONOCYTES NFR BLD AUTO: 8.4 % (ref 0–13.4)
NEUTROPHILS # BLD AUTO: 3.76 K/UL (ref 2–7.15)
NEUTROPHILS NFR BLD: 48.3 % (ref 44–72)
NRBC # BLD AUTO: 0 K/UL
NRBC BLD-RTO: 0 /100 WBC
PLATELET # BLD AUTO: 274 K/UL (ref 164–446)
PMV BLD AUTO: 11.2 FL (ref 9–12.9)
POTASSIUM SERPL-SCNC: 3.8 MMOL/L (ref 3.6–5.5)
PROT SERPL-MCNC: 7.3 G/DL (ref 6–8.2)
RBC # BLD AUTO: 4.85 M/UL (ref 4.2–5.4)
SODIUM SERPL-SCNC: 139 MMOL/L (ref 135–145)
T3FREE SERPL-MCNC: 2.79 PG/ML (ref 2–4.4)
T4 SERPL-MCNC: 6.7 UG/DL (ref 4–12)
TIBC SERPL-MCNC: 280 UG/DL (ref 250–450)
TRIGL SERPL-MCNC: 385 MG/DL (ref 0–149)
TSH SERPL DL<=0.005 MIU/L-ACNC: 4.81 UIU/ML (ref 0.38–5.33)
UIBC SERPL-MCNC: 214 UG/DL (ref 110–370)
VIT B12 SERPL-MCNC: 376 PG/ML (ref 211–911)
WBC # BLD AUTO: 7.8 K/UL (ref 4.8–10.8)

## 2021-01-07 PROCEDURE — 82306 VITAMIN D 25 HYDROXY: CPT

## 2021-01-07 PROCEDURE — 84436 ASSAY OF TOTAL THYROXINE: CPT | Mod: GA

## 2021-01-07 PROCEDURE — 83540 ASSAY OF IRON: CPT | Mod: GA

## 2021-01-07 PROCEDURE — 80061 LIPID PANEL: CPT | Mod: GA

## 2021-01-07 PROCEDURE — 85025 COMPLETE CBC W/AUTO DIFF WBC: CPT

## 2021-01-07 PROCEDURE — 80053 COMPREHEN METABOLIC PANEL: CPT

## 2021-01-07 PROCEDURE — 36415 COLL VENOUS BLD VENIPUNCTURE: CPT

## 2021-01-07 PROCEDURE — 82607 VITAMIN B-12: CPT

## 2021-01-07 PROCEDURE — 84481 FREE ASSAY (FT-3): CPT

## 2021-01-07 PROCEDURE — 83550 IRON BINDING TEST: CPT | Mod: GA

## 2021-01-07 PROCEDURE — 83735 ASSAY OF MAGNESIUM: CPT | Mod: GA

## 2021-01-07 PROCEDURE — 84443 ASSAY THYROID STIM HORMONE: CPT | Mod: GA

## 2021-01-07 RX ORDER — HYDROCHLOROTHIAZIDE 12.5 MG/1
TABLET ORAL
Qty: 90 TAB | Refills: 1 | Status: SHIPPED | OUTPATIENT
Start: 2021-01-07 | End: 2021-02-09 | Stop reason: SDUPTHER

## 2021-01-28 DIAGNOSIS — I10 ESSENTIAL HYPERTENSION: ICD-10-CM

## 2021-01-28 RX ORDER — AMLODIPINE BESYLATE 10 MG/1
TABLET ORAL
Qty: 90 TAB | Refills: 0 | Status: SHIPPED | OUTPATIENT
Start: 2021-01-28 | End: 2021-04-26

## 2021-02-05 DIAGNOSIS — I10 ESSENTIAL HYPERTENSION: ICD-10-CM

## 2021-02-05 RX ORDER — LISINOPRIL 40 MG/1
TABLET ORAL
Qty: 90 TAB | Refills: 0 | Status: SHIPPED | OUTPATIENT
Start: 2021-02-05 | End: 2021-05-06

## 2021-02-09 ENCOUNTER — OFFICE VISIT (OUTPATIENT)
Dept: CARDIOLOGY | Facility: PHYSICIAN GROUP | Age: 71
End: 2021-02-09
Payer: MEDICARE

## 2021-02-09 VITALS
SYSTOLIC BLOOD PRESSURE: 144 MMHG | DIASTOLIC BLOOD PRESSURE: 60 MMHG | HEART RATE: 72 BPM | BODY MASS INDEX: 31.07 KG/M2 | HEIGHT: 64 IN | OXYGEN SATURATION: 96 % | WEIGHT: 182 LBS

## 2021-02-09 DIAGNOSIS — Z91.89 10 YEAR RISK OF MI OR STROKE 7.5% OR GREATER: ICD-10-CM

## 2021-02-09 DIAGNOSIS — E78.5 DYSLIPIDEMIA: ICD-10-CM

## 2021-02-09 DIAGNOSIS — I31.39 PERICARDIAL EFFUSION: ICD-10-CM

## 2021-02-09 DIAGNOSIS — I10 ESSENTIAL HYPERTENSION: ICD-10-CM

## 2021-02-09 DIAGNOSIS — Z91.89 OTHER SPECIFIED PERSONAL RISK FACTORS, NOT ELSEWHERE CLASSIFIED: ICD-10-CM

## 2021-02-09 PROCEDURE — 99214 OFFICE O/P EST MOD 30 MIN: CPT | Performed by: INTERNAL MEDICINE

## 2021-02-09 RX ORDER — HYDROCHLOROTHIAZIDE 25 MG/1
25 TABLET ORAL
Qty: 90 TAB | Refills: 3 | Status: SHIPPED | OUTPATIENT
Start: 2021-02-09 | End: 2022-02-28

## 2021-02-09 RX ORDER — HYDROCHLOROTHIAZIDE 25 MG/1
12.5 TABLET ORAL
Qty: 90 TAB | Refills: 3 | Status: SHIPPED | OUTPATIENT
Start: 2021-02-09 | End: 2021-02-09 | Stop reason: SDUPTHER

## 2021-02-09 ASSESSMENT — ENCOUNTER SYMPTOMS
DEPRESSION: 0
FEVER: 0
ABDOMINAL PAIN: 0
VOMITING: 0
DIARRHEA: 0
ORTHOPNEA: 0
NAUSEA: 0
NEAR-SYNCOPE: 0
SHORTNESS OF BREATH: 0
BLURRED VISION: 0
COUGH: 0
DIZZINESS: 0
PND: 0
BACK PAIN: 0
PALPITATIONS: 0
FLANK PAIN: 0
HEARTBURN: 0
WEIGHT LOSS: 0
SYNCOPE: 0
CONSTIPATION: 0
DECREASED APPETITE: 0
DYSPNEA ON EXERTION: 0
ALTERED MENTAL STATUS: 0
IRREGULAR HEARTBEAT: 0
WEIGHT GAIN: 0
CLAUDICATION: 0

## 2021-02-09 ASSESSMENT — FIBROSIS 4 INDEX: FIB4 SCORE: 1.62

## 2021-02-09 NOTE — PROGRESS NOTES
Cardiology Note    Chief Complaint   Patient presents with   • HTN (Controlled)   • Dyslipidemia     History of Present Illness: Karolina Gilmore is a 70 y.o. female PMH HTN, HLD, pericardial effusion who presents for follow up.    No cardiac complaints. Checks blood pressure at home; now typically above 130/80. Compliant with medications and denies adverse effects. Diet has been less than optimal during covid pandemic.     Review of Systems   Constitution: Negative for decreased appetite, fever, malaise/fatigue, weight gain and weight loss.   HENT: Negative for congestion and nosebleeds.    Eyes: Negative for blurred vision.   Cardiovascular: Negative for chest pain, claudication, dyspnea on exertion, irregular heartbeat, leg swelling, near-syncope, orthopnea, palpitations, paroxysmal nocturnal dyspnea and syncope.   Respiratory: Negative for cough and shortness of breath.    Endocrine: Negative for cold intolerance and heat intolerance.   Skin: Negative for rash.   Musculoskeletal: Negative for back pain.   Gastrointestinal: Negative for abdominal pain, constipation, diarrhea, heartburn, melena, nausea and vomiting.   Genitourinary: Negative for dysuria, flank pain and hematuria.   Neurological: Negative for dizziness.   Psychiatric/Behavioral: Negative for altered mental status and depression.         Past Medical History:   Diagnosis Date   • Cerumen debris on tympanic membrane of right ear 5/24/2016   • GERD (gastroesophageal reflux disease)    • WEBBER (nonalcoholic steatohepatitis)    • Vertigo 5/24/2017         Past Surgical History:   Procedure Laterality Date   • HYSTERECTOMY, TOTAL ABDOMINAL  96   • CARPAL TUNNEL RELEASE      right         Current Outpatient Medications   Medication Sig Dispense Refill   • hydroCHLOROthiazide (HYDRODIURIL) 25 MG Tab Take 1 Tab by mouth every day. 90 Tab 3   • lisinopril (PRINIVIL) 40 MG tablet TAKE 1 TABLET BY MOUTH DAILY 90 Tab 0   • amLODIPine (NORVASC) 10 MG Tab TAKE  1 TABLET BY MOUTH EVERY DAY 90 Tab 0   • pravastatin (PRAVACHOL) 20 MG Tab Take 1 Tab by mouth every bedtime. 90 Tab 3   • Multiple Vitamins-Minerals (ICAPS AREDS 2 PO)      • estradiol (VAGIFEM) 10 MCG Tab Insert 10 mcg into the vagina. TWICE Q WEEK.     • Calcium Carbonate-Vitamin D (CALCIUM 500/D PO) Take  by mouth.     • aspirin EC (ECOTRIN) 81 MG Tablet Delayed Response Take 81 mg by mouth every day.     • pantoprazole (PROTONIX) 40 MG TBEC Take 40 mg by mouth every day.     • Probiotic Product (PROBIOTIC DAILY PO) Take  by mouth.     • Multiple Vitamins-Minerals (MULTIVITAMIN PO) Take  by mouth.     • vitamin D (CHOLECALCIFEROL) 1000 UNIT TABS Take 1,000 Units by mouth every day.       No current facility-administered medications for this visit.          Allergies   Allergen Reactions   • Amoxicillin-Pot Clavulanate Rash   • Augmentin    • Ciprofloxacin Rash   • Neomycin Rash         Family History   Problem Relation Age of Onset   • Heart Attack Mother 76   • Stroke Mother    • Other Father 52        Brain tumour          Social History     Socioeconomic History   • Marital status:      Spouse name: Not on file   • Number of children: Not on file   • Years of education: Not on file   • Highest education level: Not on file   Occupational History   • Not on file   Social Needs   • Financial resource strain: Not on file   • Food insecurity     Worry: Not on file     Inability: Not on file   • Transportation needs     Medical: Not on file     Non-medical: Not on file   Tobacco Use   • Smoking status: Never Smoker   • Smokeless tobacco: Never Used   Substance and Sexual Activity   • Alcohol use: No     Alcohol/week: 0.0 oz   • Drug use: No   • Sexual activity: Never   Lifestyle   • Physical activity     Days per week: Not on file     Minutes per session: Not on file   • Stress: Not on file   Relationships   • Social connections     Talks on phone: Not on file     Gets together: Not on file     Attends  "[employer] service: Not on file     Active member of club or organization: Not on file     Attends meetings of clubs or organizations: Not on file     Relationship status: Not on file   • Intimate partner violence     Fear of current or ex partner: Not on file     Emotionally abused: Not on file     Physically abused: Not on file     Forced sexual activity: Not on file   Other Topics Concern   • Not on file   Social History Narrative   • Not on file         Physical Exam:  Ambulatory Vitals  /60 (BP Location: Left arm, Patient Position: Sitting, BP Cuff Size: Adult)   Pulse 72   Ht 1.626 m (5' 4\")   Wt 82.6 kg (182 lb)   SpO2 96%    BP Readings from Last 4 Encounters:   02/09/21 144/60   01/22/20 146/88   05/09/19 140/60   04/09/19 (!) 162/76     Weight/BMI:   Vitals:    02/09/21 1406   BP: 144/60   Weight: 82.6 kg (182 lb)   Height: 1.626 m (5' 4\")    Body mass index is 31.24 kg/m².  Wt Readings from Last 4 Encounters:   02/09/21 82.6 kg (182 lb)   01/22/20 83.5 kg (184 lb)   05/09/19 83.5 kg (184 lb)   04/09/19 81.6 kg (180 lb)       Physical Exam   Constitutional: She is oriented to person, place, and time and well-developed, well-nourished, and in no distress. No distress.   HENT:   Head: Normocephalic and atraumatic.   Eyes: Pupils are equal, round, and reactive to light. Conjunctivae are normal.   Neck: Normal range of motion. Neck supple. No JVD present.   Cardiovascular: Normal rate, regular rhythm, normal heart sounds and intact distal pulses. Exam reveals no gallop and no friction rub.   No murmur heard.  Pulmonary/Chest: Effort normal and breath sounds normal. No respiratory distress. She has no wheezes. She has no rales. She exhibits no tenderness.   Abdominal: Soft. Bowel sounds are normal. She exhibits no distension.   Musculoskeletal:         General: No edema.   Neurological: She is alert and oriented to person, place, and time.   Skin: Skin is warm and dry.   Psychiatric: Affect and " judgment normal.       Lab Data Review:  Lab Results   Component Value Date/Time    CHOLSTRLTOT 221 (H) 01/07/2021 08:05 AM    LDL 95 01/07/2021 08:05 AM    HDL 49 01/07/2021 08:05 AM    TRIGLYCERIDE 385 (H) 01/07/2021 08:05 AM       Lab Results   Component Value Date/Time    SODIUM 139 01/07/2021 08:05 AM    POTASSIUM 3.8 01/07/2021 08:05 AM    CHLORIDE 101 01/07/2021 08:05 AM    CO2 26 01/07/2021 08:05 AM    GLUCOSE 100 (H) 01/07/2021 08:05 AM    BUN 16 01/07/2021 08:05 AM    CREATININE 0.90 01/07/2021 08:05 AM     CrCl cannot be calculated (Patient's most recent lab result is older than the maximum 7 days allowed.).  Lab Results   Component Value Date/Time    ALKPHOSPHAT 88 01/07/2021 08:05 AM    ASTSGOT 24 01/07/2021 08:05 AM    ALTSGPT 14 01/07/2021 08:05 AM    TBILIRUBIN 0.4 01/07/2021 08:05 AM      Lab Results   Component Value Date/Time    WBC 7.8 01/07/2021 08:05 AM     Lab Results   Component Value Date/Time    HBA1C 5.5 11/06/2013 07:11 AM     No components found for: TROP      Cardiac Imaging and Procedures Review:      EKG 1/22/20 - sinus, first deg AVB, normal qtc    TTE 2/11/2020  CONCLUSIONS  Compared to the report of the prior study done 10/16/18 -there is been   no significant change.  Normal left ventricular chamber size.  Left ventricular ejection fraction is visually estimated to be 65%.  Indeterminate diastolic function.  No significant valve abnormalities.   Small pericardial effusion without evidence of hemodynamic compromise.  Left Ventricle  Normal left ventricular chamber size. Normal left ventricular wall   thickness. Normal left ventricular systolic function. Left ventricular   ejection fraction is visually estimated to be 65%. Normal regional wall   motion. Indeterminate diastolic function.  Pericardium  Small pericardial effusion without evidence of hemodynamic compromise.    TTE 10/2018  CONCLUSIONS  Normal left ventricular systolic function.  Left ventricular ejection fraction is  visually estimated to be 65%.  Mild mitral regurgitation.  Small pericardial effusion without evidence of hemodynamic compromise.  No Doppler evidence of patent foramen ovale.    Medical Decision Making:  Problem List Items Addressed This Visit     Hypertension    Relevant Medications    hydroCHLOROthiazide (HYDRODIURIL) 25 MG Tab    Pericardial effusion    Relevant Medications    hydroCHLOROthiazide (HYDRODIURIL) 25 MG Tab    Dyslipidemia    Relevant Orders    LIPID PANEL    10 year risk of MI or stroke 7.5% or greater (intermediate)      Other Visit Diagnoses     Other specified personal risk factors, not elsewhere classified        Relevant Orders    CT-CARDIAC SCORING        Pericardial effusion - stable, small, asymptomatic.     HTN - elevated. Goal 120/80. Increase HCTZ. Continue lisinopril and amlodipine. Next agent spironolactone.     HLD / 10 year risk ascvd - check CAC CT and repeat lipids prior to next visit after diet modifications.     It was my pleasure to meet with Ms. Gilmore.

## 2021-02-09 NOTE — PATIENT INSTRUCTIONS
"  If sustains above 130/80, call cardiology.     Hypertension, Adult  High blood pressure (hypertension) is when the force of blood pumping through the arteries is too strong. The arteries are the blood vessels that carry blood from the heart throughout the body. Hypertension forces the heart to work harder to pump blood and may cause arteries to become narrow or stiff. Untreated or uncontrolled hypertension can cause a heart attack, heart failure, a stroke, kidney disease, and other problems.  A blood pressure reading consists of a higher number over a lower number. Ideally, your blood pressure should be below 120/80. The first (\"top\") number is called the systolic pressure. It is a measure of the pressure in your arteries as your heart beats. The second (\"bottom\") number is called the diastolic pressure. It is a measure of the pressure in your arteries as the heart relaxes.  What are the causes?  The exact cause of this condition is not known. There are some conditions that result in or are related to high blood pressure.  What increases the risk?  Some risk factors for high blood pressure are under your control. The following factors may make you more likely to develop this condition:  · Smoking.  · Having type 2 diabetes mellitus, high cholesterol, or both.  · Not getting enough exercise or physical activity.  · Being overweight.  · Having too much fat, sugar, calories, or salt (sodium) in your diet.  · Drinking too much alcohol.  Some risk factors for high blood pressure may be difficult or impossible to change. Some of these factors include:  · Having chronic kidney disease.  · Having a family history of high blood pressure.  · Age. Risk increases with age.  · Race. You may be at higher risk if you are .  · Gender. Men are at higher risk than women before age 45. After age 65, women are at higher risk than men.  · Having obstructive sleep apnea.  · Stress.  What are the signs or symptoms?  High " blood pressure may not cause symptoms. Very high blood pressure (hypertensive crisis) may cause:  · Headache.  · Anxiety.  · Shortness of breath.  · Nosebleed.  · Nausea and vomiting.  · Vision changes.  · Severe chest pain.  · Seizures.  How is this diagnosed?  This condition is diagnosed by measuring your blood pressure while you are seated, with your arm resting on a flat surface, your legs uncrossed, and your feet flat on the floor. The cuff of the blood pressure monitor will be placed directly against the skin of your upper arm at the level of your heart. It should be measured at least twice using the same arm. Certain conditions can cause a difference in blood pressure between your right and left arms.  Certain factors can cause blood pressure readings to be lower or higher than normal for a short period of time:  · When your blood pressure is higher when you are in a health care provider's office than when you are at home, this is called white coat hypertension. Most people with this condition do not need medicines.  · When your blood pressure is higher at home than when you are in a health care provider's office, this is called masked hypertension. Most people with this condition may need medicines to control blood pressure.  If you have a high blood pressure reading during one visit or you have normal blood pressure with other risk factors, you may be asked to:  · Return on a different day to have your blood pressure checked again.  · Monitor your blood pressure at home for 1 week or longer.  If you are diagnosed with hypertension, you may have other blood or imaging tests to help your health care provider understand your overall risk for other conditions.  How is this treated?  This condition is treated by making healthy lifestyle changes, such as eating healthy foods, exercising more, and reducing your alcohol intake. Your health care provider may prescribe medicine if lifestyle changes are not enough to  get your blood pressure under control, and if:  · Your systolic blood pressure is above 130.  · Your diastolic blood pressure is above 80.  Your personal target blood pressure may vary depending on your medical conditions, your age, and other factors.  Follow these instructions at home:  Eating and drinking    · Eat a diet that is high in fiber and potassium, and low in sodium, added sugar, and fat. An example eating plan is called the DASH (Dietary Approaches to Stop Hypertension) diet. To eat this way:  ? Eat plenty of fresh fruits and vegetables. Try to fill one half of your plate at each meal with fruits and vegetables.  ? Eat whole grains, such as whole-wheat pasta, brown rice, or whole-grain bread. Fill about one fourth of your plate with whole grains.  ? Eat or drink low-fat dairy products, such as skim milk or low-fat yogurt.  ? Avoid fatty cuts of meat, processed or cured meats, and poultry with skin. Fill about one fourth of your plate with lean proteins, such as fish, chicken without skin, beans, eggs, or tofu.  ? Avoid pre-made and processed foods. These tend to be higher in sodium, added sugar, and fat.  · Reduce your daily sodium intake. Most people with hypertension should eat less than 1,500 mg of sodium a day.  · Do not drink alcohol if:  ? Your health care provider tells you not to drink.  ? You are pregnant, may be pregnant, or are planning to become pregnant.  · If you drink alcohol:  ? Limit how much you use to:  § 0-1 drink a day for women.  § 0-2 drinks a day for men.  ? Be aware of how much alcohol is in your drink. In the [place] one drink equals one 12 oz bottle of beer (355 mL), one 5 oz glass of wine (148 mL), or one 1½ oz glass of hard liquor (44 mL).  Lifestyle    · Work with your health care provider to maintain a healthy body weight or to lose weight. Ask what an ideal weight is for you.  · Get at least 30 minutes of exercise most days of the week. Activities may include walking,  swimming, or biking.  · Include exercise to strengthen your muscles (resistance exercise), such as Pilates or lifting weights, as part of your weekly exercise routine. Try to do these types of exercises for 30 minutes at least 3 days a week.  · Do not use any products that contain nicotine or tobacco, such as cigarettes, e-cigarettes, and chewing tobacco. If you need help quitting, ask your health care provider.  · Monitor your blood pressure at home as told by your health care provider.  · Keep all follow-up visits as told by your health care provider. This is important.  Medicines  · Take over-the-counter and prescription medicines only as told by your health care provider. Follow directions carefully. Blood pressure medicines must be taken as prescribed.  · Do not skip doses of blood pressure medicine. Doing this puts you at risk for problems and can make the medicine less effective.  · Ask your health care provider about side effects or reactions to medicines that you should watch for.  Contact a health care provider if you:  · Think you are having a reaction to a medicine you are taking.  · Have headaches that keep coming back (recurring).  · Feel dizzy.  · Have swelling in your ankles.  · Have trouble with your vision.  Get help right away if you:  · Develop a severe headache or confusion.  · Have unusual weakness or numbness.  · Feel faint.  · Have severe pain in your chest or abdomen.  · Vomit repeatedly.  · Have trouble breathing.  Summary  · Hypertension is when the force of blood pumping through your arteries is too strong. If this condition is not controlled, it may put you at risk for serious complications.  · Your personal target blood pressure may vary depending on your medical conditions, your age, and other factors. For most people, a normal blood pressure is less than 120/80.  · Hypertension is treated with lifestyle changes, medicines, or a combination of both. Lifestyle changes include losing  weight, eating a healthy, low-sodium diet, exercising more, and limiting alcohol.  This information is not intended to replace advice given to you by your health care provider. Make sure you discuss any questions you have with your health care provider.  Document Released: 12/18/2006 Document Revised: 08/28/2019 Document Reviewed: 08/28/2019  Elsevier Patient Education © 2020 [employer]

## 2021-03-01 NOTE — PATIENT INSTRUCTIONS
Case Management Discharge Note      Final Note: Rolling walker from [facility] deleivered to patient's bedside.  Meredith Wilson, RN [phone]         Selected Continued Care - Admitted Since 2/27/2021     Destination    No services have been selected for the patient.              Durable Medical Equipment     Service Provider Selected Services Address Phone Fax Patient Preferred    [facility]  Durable Medical Equipment [address] [place] [id number] [phone] --          Dialysis/Infusion    No services have been selected for the patient.              Home Medical Care    No services have been selected for the patient.              Therapy    No services have been selected for the patient.              Community Resources    No services have been selected for the patient.                       Final Discharge Disposition Code: 01 - home or self-care   bp monitor at different times of day  Neurology  Recheck labs.

## 2021-04-20 DIAGNOSIS — E78.5 DYSLIPIDEMIA: ICD-10-CM

## 2021-04-20 RX ORDER — PRAVASTATIN SODIUM 20 MG
TABLET ORAL
Qty: 90 TABLET | Refills: 3 | Status: SHIPPED | OUTPATIENT
Start: 2021-04-20 | End: 2022-05-12 | Stop reason: SDUPTHER

## 2021-04-24 DIAGNOSIS — I10 ESSENTIAL HYPERTENSION: ICD-10-CM

## 2021-04-26 RX ORDER — AMLODIPINE BESYLATE 10 MG/1
TABLET ORAL
Qty: 90 TABLET | Refills: 3 | Status: SHIPPED | OUTPATIENT
Start: 2021-04-26 | End: 2022-05-09

## 2021-05-06 DIAGNOSIS — I10 ESSENTIAL HYPERTENSION: ICD-10-CM

## 2021-05-06 RX ORDER — LISINOPRIL 40 MG/1
TABLET ORAL
Qty: 90 TABLET | Refills: 3 | Status: SHIPPED | OUTPATIENT
Start: 2021-05-06 | End: 2022-05-12 | Stop reason: SDUPTHER

## 2021-05-14 ENCOUNTER — TELEPHONE (OUTPATIENT)
Dept: CARDIOLOGY | Facility: MEDICAL CENTER | Age: 71
End: 2021-05-14

## 2021-05-14 NOTE — TELEPHONE ENCOUNTER
Returned call. Pt explains that she has Medicare, but her secondary insurance is changing to [employer] in July, and Dr. Suresh isn't in network for them. She is wondering if we can request an exception for him to be in network. Advised that she should find out who is in network for her, and if there is a reason that she can't see those in network providers (such as they don't have a [facility]) then she could request an exception from her insurance. Informed that this request can likely come from her, but if they need something specific from her doctor's office to let us know. Since her primary insurance is still accepted here, she says she will plan on keeping her currently scheduled appt with [facility] on 9/28 for now.

## 2021-05-14 NOTE — TELEPHONE ENCOUNTER
[facility]    Pt called stating her insurance is changing July 1st and Pt states she can continue to see [facility] if he requests a gap exception. If any questions please call Pt back at [phone].    Thank you

## 2021-08-31 ENCOUNTER — HOSPITAL ENCOUNTER (OUTPATIENT)
Dept: RADIOLOGY | Facility: MEDICAL CENTER | Age: 71
End: 2021-08-31
Attending: INTERNAL MEDICINE
Payer: MEDICARE

## 2021-08-31 DIAGNOSIS — Z91.89 OTHER SPECIFIED PERSONAL RISK FACTORS, NOT ELSEWHERE CLASSIFIED: ICD-10-CM

## 2021-08-31 PROCEDURE — 4410556 CT-CARDIAC SCORING (SELF PAY ONLY)

## 2021-09-21 ENCOUNTER — HOSPITAL ENCOUNTER (OUTPATIENT)
Dept: LAB | Facility: MEDICAL CENTER | Age: 71
End: 2021-09-21
Attending: INTERNAL MEDICINE
Payer: MEDICARE

## 2021-09-21 LAB
CHOLEST SERPL-MCNC: 212 MG/DL (ref 100–199)
FASTING STATUS PATIENT QL REPORTED: NORMAL
HDLC SERPL-MCNC: 55 MG/DL
LDLC SERPL CALC-MCNC: 109 MG/DL
TRIGL SERPL-MCNC: 241 MG/DL (ref 0–149)

## 2021-09-21 PROCEDURE — 36415 COLL VENOUS BLD VENIPUNCTURE: CPT

## 2021-09-21 PROCEDURE — 80061 LIPID PANEL: CPT

## 2021-09-28 ENCOUNTER — OFFICE VISIT (OUTPATIENT)
Dept: CARDIOLOGY | Facility: PHYSICIAN GROUP | Age: 71
End: 2021-09-28
Payer: MEDICARE

## 2021-09-28 VITALS
BODY MASS INDEX: 30.22 KG/M2 | RESPIRATION RATE: 12 BRPM | WEIGHT: 177 LBS | HEART RATE: 90 BPM | SYSTOLIC BLOOD PRESSURE: 136 MMHG | HEIGHT: 64 IN | DIASTOLIC BLOOD PRESSURE: 60 MMHG | OXYGEN SATURATION: 97 %

## 2021-09-28 DIAGNOSIS — E78.5 DYSLIPIDEMIA: ICD-10-CM

## 2021-09-28 DIAGNOSIS — I31.39 PERICARDIAL EFFUSION: ICD-10-CM

## 2021-09-28 DIAGNOSIS — Z91.89 10 YEAR RISK OF MI OR STROKE 7.5% OR GREATER: ICD-10-CM

## 2021-09-28 DIAGNOSIS — I10 ESSENTIAL HYPERTENSION: ICD-10-CM

## 2021-09-28 PROCEDURE — 99214 OFFICE O/P EST MOD 30 MIN: CPT | Performed by: INTERNAL MEDICINE

## 2021-09-28 RX ORDER — CHLORAL HYDRATE 500 MG
1000 CAPSULE ORAL 2 TIMES DAILY
COMMUNITY

## 2021-09-28 ASSESSMENT — ENCOUNTER SYMPTOMS
DIARRHEA: 0
DIZZINESS: 0
VOMITING: 0
CLAUDICATION: 0
COUGH: 0
FEVER: 0
WEIGHT LOSS: 0
SYNCOPE: 0
IRREGULAR HEARTBEAT: 0
SHORTNESS OF BREATH: 0
HEARTBURN: 0
FLANK PAIN: 0
DEPRESSION: 0
DECREASED APPETITE: 0
ALTERED MENTAL STATUS: 0
NAUSEA: 0
CONSTIPATION: 0
BLURRED VISION: 0
PND: 0
BACK PAIN: 0
PALPITATIONS: 0
NEAR-SYNCOPE: 0
ORTHOPNEA: 0
DYSPNEA ON EXERTION: 0
WEIGHT GAIN: 0
ABDOMINAL PAIN: 0

## 2021-09-28 ASSESSMENT — FIBROSIS 4 INDEX: FIB4 SCORE: 1.64

## 2021-09-28 NOTE — PATIENT INSTRUCTIONS
Cholesterol Content in Foods  Cholesterol is a waxy, fat-like substance that helps to carry fat in the blood. The body needs cholesterol in small amounts, but too much cholesterol can cause damage to the arteries and heart.  Most people should eat less than 200 milligrams (mg) of cholesterol a day.  Foods with cholesterol    Cholesterol is found in animal-based foods, such as meat, seafood, and dairy. Generally, low-fat dairy and lean meats have less cholesterol than full-fat dairy and fatty meats. The milligrams of cholesterol per serving (mg per serving) of common cholesterol-containing foods are listed below.  Meat and other proteins  · Egg -- one large whole egg has 186 mg.  · Veal shank -- 4 oz has 141 mg.  · Lean ground turkey (93% lean) -- 4 oz has 118 mg.  · Fat-trimmed lamb loin -- 4 oz has 106 mg.  · Lean ground beef (90% lean) -- 4 oz has 100 mg.  · Lobster -- 3.5 oz has 90 mg.  · Pork loin chops -- 4 oz has 86 mg.  · Canned salmon -- 3.5 oz has 83 mg.  · Fat-trimmed beef top loin -- 4 oz has 78 mg.  · Frankfurter -- 1 jeffry (3.5 oz) has 77 mg.  · Crab -- 3.5 oz has 71 mg.  · Roasted chicken without skin, white meat -- 4 oz has 66 mg.  · Light bologna -- 2 oz has 45 mg.  · Deli-cut turkey -- 2 oz has 31 mg.  · Canned tuna -- 3.5 oz has 31 mg.  · Cooper -- 1 oz has 29 mg.  · Oysters and mussels (raw) -- 3.5 oz has 25 mg.  · Mackerel -- 1 oz has 22 mg.  · Trout -- 1 oz has 20 mg.  · Pork sausage -- 1 link (1 oz) has 17 mg.  · [place] -- 1 oz has 16 mg.  · Tilapia -- 1 oz has 14 mg.  Dairy  · Soft-serve ice cream -- ½ cup (4 oz) has 103 mg.  · Whole-milk yogurt -- 1 cup (8 oz) has 29 mg.  · Cheddar cheese -- 1 oz has 28 mg.  · American cheese -- 1 oz has 28 mg.  · Whole milk -- 1 cup (8 oz) has 23 mg.  · 2% milk -- 1 cup (8 oz) has 18 mg.  · Cream cheese -- 1 tablespoon (Tbsp) has 15 mg.  · Cottage cheese -- ½ cup (4 oz) has 14 mg.  · Low-fat (1%) milk -- 1 cup (8 oz) has 10 mg.  · Sour cream -- 1 Tbsp has 8.5  mg.  · Low-fat yogurt -- 1 cup (8 oz) has 8 mg.  · Nonfat Greek yogurt -- 1 cup (8 oz) has 7 mg.  · Half-and-half cream -- 1 Tbsp has 5 mg.  Fats and oils  · Cod liver oil -- 1 tablespoon (Tbsp) has 82 mg.  · Butter -- 1 Tbsp has 15 mg.  · Lard -- 1 Tbsp has 14 mg.  · Cooper grease -- 1 Tbsp has 14 mg.  · Mayonnaise -- 1 Tbsp has 5-10 mg.  · Margarine -- 1 Tbsp has 3-10 mg.  Exact amounts of cholesterol in these foods may vary depending on specific ingredients and brands.  Foods without cholesterol  Most plant-based foods do not have cholesterol unless you combine them with a food that has cholesterol. Foods without cholesterol include:  · Grains and cereals.  · Vegetables.  · Fruits.  · Vegetable oils, such as olive, canola, and sunflower oil.  · Legumes, such as peas, beans, and lentils.  · Nuts and seeds.  · Egg whites.  Summary  · The body needs cholesterol in small amounts, but too much cholesterol can cause damage to the arteries and heart.  · Most people should eat less than 200 milligrams (mg) of cholesterol a day.  This information is not intended to replace advice given to you by your health care provider. Make sure you discuss any questions you have with your health care provider.  Document Released: 08/14/2018 Document Revised: 11/30/2018 Document Reviewed: 08/14/2018  [employer] Patient Education © 2020 [employer]      High Triglycerides Eating Plan  Triglycerides are a type of fat in the blood. High levels of triglycerides can increase your risk of heart disease and stroke. If your triglyceride levels are high, choosing the right foods can help lower your triglycerides and keep your heart healthy. Work with your health care provider or a diet and nutrition specialist (dietitian) to develop an eating plan that is right for you.  What are tips for following this plan?  General guidelines    · Lose weight, if you are overweight. For most people, losing 5-10 lbs (2-5 kg) helps lower triglyceride levels. A  weight-loss plan may include.  ? 30 minutes of exercise at least 5 days a week.  ? Reducing the amount of calories, sugar, and fat you eat.  · Eat a wide variety of fresh fruits, vegetables, and whole grains. These foods are high in fiber.  · Eat foods that contain healthy fats, such as fatty fish, nuts, seeds, and olive oil.  · Avoid foods that are high in added sugar, added salt (sodium), saturated fat, and trans fat.  · Avoid low-fiber, refined carbohydrates such as white bread, crackers, noodles, and white rice.  · Avoid foods with partially hydrogenated oils (trans fats), such as fried foods or stick margarine.  · Limit alcohol intake to no more than 1 drink a day for nonpregnant women and 2 drinks a day for men. One drink equals 12 oz of beer, 5 oz of wine, or 1½ oz of hard liquor. Your health care provider may recommend that you drink less depending on your overall health.  Reading food labels  · Check food labels for the amount of saturated fat. Choose foods with no or very little saturated fat.  · Check food labels for the amount of trans fat. Choose foods with no trans fat.  · Check food labels for the amount of cholesterol. Choose foods low in cholesterol. Ask your dietitian how much cholesterol you should have each day.  · Check food labels for the amount of sodium. Choose foods with less than 140 milligrams (mg) per serving.  Shopping  · Buy dairy products labeled as nonfat (skim) or low-fat (1%).  · Avoid buying processed or prepackaged foods. These are often high in added sugar, sodium, and fat.  Cooking  · Choose healthy fats when cooking, such as olive oil or canola oil.  · Cook foods using lower fat methods, such as baking, broiling, boiling, or grilling.  · Make your own sauces, dressings, and marinades when possible, instead of buying them. Store-bought sauces, dressings, and marinades are often high in sodium and sugar.  Meal planning  · Eat more home-cooked food and less restaurant, buffet, and  fast food.  · Eat fatty fish at least 2 times each week. Examples of fatty fish include salmon, trout, mackerel, tuna, and herring.  · If you eat whole eggs, do not eat more than 3 egg yolks per week.  What foods are recommended?  The items listed may not be a complete list. Talk with your dietitian about what dietary choices are best for you.  Grains  Whole wheat or whole grain breads, crackers, cereals, and pasta. Unsweetened oatmeal. Bulgur. Barley. Quinoa. Brown rice. Whole wheat flour tortillas.  Vegetables  Fresh or frozen vegetables. Low-sodium canned vegetables.  Fruits  All fresh, canned (in natural juice), or frozen fruits.  Meats and other protein foods  Skinless chicken or turkey. Ground chicken or turkey. Lean cuts of pork, trimmed of fat. Fish and seafood, especially salmon, trout, and herring. Egg whites. Dried beans, peas, or lentils. Unsalted nuts or seeds. Unsalted canned beans. Natural peanut or almond butter.  Dairy  Low-fat dairy products. Skim or low-fat (1%) milk. Reduced fat (2%) and low-sodium cheese. Low-fat ricotta cheese. Low-fat cottage cheese. Plain, low-fat yogurt.  Fats and oils  Tub margarine without trans fats. Light or reduced-fat mayonnaise. Light or reduced-fat salad dressings. Avocado. Safflower, olive, sunflower, soybean, and canola oils.  What foods are not recommended?  The items listed may not be a complete list. Talk with your dietitian about what dietary choices are best for you.  Grains  White bread. White (regular) pasta. White rice. Cornbread. Bagels. Pastries. Crackers that contain trans fat.  Vegetables  Creamed or fried vegetables. Vegetables in a cheese sauce.  Fruits  Sweetened dried fruit. Canned fruit in syrup. Fruit juice.  Meats and other protein foods  Fatty cuts of meat. Ribs. Chicken wings. [place]. Sausage. Bologna. Salami. Chitterlings. Fatback. Hot dogs. Bratwurst. Packaged lunch meats.  Dairy  Whole or reduced-fat (2%) milk. Half-and-half. Cream cheese.  Full-fat or sweetened yogurt. Full-fat cheese. Nondairy creamers. Whipped toppings. Processed cheese or cheese spreads. Cheese curds.  Beverages  Alcohol. Sweetened drinks, such as soda, lemonade, fruit drinks, or punches.  Fats and oils  Butter. Stick margarine. Lard. Shortening. Ghee. Cooper fat. Tropical oils, such as coconut, palm kernel, or palm oils.  Sweets and desserts  Corn syrup. Sugars. Honey. Molasses. Candy. Jam and jelly. Syrup. Sweetened cereals. Cookies. Pies. Cakes. Donuts. Muffins. Ice cream.  Condiments  Store-bought sauces, dressings, and marinades that are high in sugar, such as ketchup and barbecue sauce.  Summary  · High levels of triglycerides can increase the risk of heart disease and stroke. Choosing the right foods can help lower your triglycerides.  · Eat plenty of fresh fruits, vegetables, and whole grains. Choose low-fat dairy and lean meats. Eat fatty fish at least twice a week.  · Avoid processed and prepackaged foods with added sugar, sodium, saturated fat, and trans fat.  · If you need suggestions or have questions about what types of food are good for you, talk with your health care provider or a dietitian.  This information is not intended to replace advice given to you by your health care provider. Make sure you discuss any questions you have with your health care provider.  Document Released: 10/05/2005 Document Revised: 11/30/2018 Document Reviewed: 02/20/2018  Elsevier Patient Education © 2020 [employer]

## 2021-09-28 NOTE — PROGRESS NOTES
Cardiology Note    Chief Complaint   Patient presents with   • HTN (Controlled)     F/V Dx: Pericardial effusion   • Dyslipidemia     History of Present Illness: Karolina Gilmore is a 71 y.o. female PMH HTN, HLD, pericardial effusion who presents for follow up.    Doing well. No active cardiac complaints. Home pressures 120s/60s. Admits diet hasn't improved; consumes carbs, sweets, high cholesterol foods including red meat. Compliant with medications. Reminds that has been on statin for 3-4 years. No adverse effects.     Review of Systems   Constitutional: Negative for decreased appetite, fever, malaise/fatigue, weight gain and weight loss.   HENT: Negative for congestion and nosebleeds.    Eyes: Negative for blurred vision.   Cardiovascular: Negative for chest pain, claudication, dyspnea on exertion, irregular heartbeat, leg swelling, near-syncope, orthopnea, palpitations, paroxysmal nocturnal dyspnea and syncope.   Respiratory: Negative for cough and shortness of breath.    Endocrine: Negative for cold intolerance and heat intolerance.   Skin: Negative for rash.   Musculoskeletal: Negative for back pain.   Gastrointestinal: Negative for abdominal pain, constipation, diarrhea, heartburn, melena, nausea and vomiting.   Genitourinary: Negative for dysuria, flank pain and hematuria.   Neurological: Negative for dizziness.   Psychiatric/Behavioral: Negative for altered mental status and depression.         Past Medical History:   Diagnosis Date   • Cerumen debris on tympanic membrane of right ear 5/24/2016   • GERD (gastroesophageal reflux disease)    • WEBBER (nonalcoholic steatohepatitis)    • Vertigo 5/24/2017         Past Surgical History:   Procedure Laterality Date   • HYSTERECTOMY, TOTAL ABDOMINAL  96   • CARPAL TUNNEL RELEASE      right         Current Outpatient Medications   Medication Sig Dispense Refill   • Omega-3 Fatty Acids (FISH OIL) 1000 MG Cap capsule Take 1,000 mg by mouth 2 times a day.     •  lisinopril (PRINIVIL) 40 MG tablet TAKE 1 TABLET BY MOUTH DAILY 90 tablet 3   • amLODIPine (NORVASC) 10 MG Tab TAKE 1 TABLET BY MOUTH EVERY DAY 90 tablet 3   • pravastatin (PRAVACHOL) 20 MG Tab TAKE 1 TABLET BY MOUTH EVERY NIGHT AT BEDTIME 90 tablet 3   • hydroCHLOROthiazide (HYDRODIURIL) 25 MG Tab Take 1 Tab by mouth every day. 90 Tab 3   • Multiple Vitamins-Minerals (ICAPS AREDS 2 PO)      • estradiol (VAGIFEM) 10 MCG Tab Insert 10 mcg into the vagina. TWICE Q WEEK.     • Calcium Carbonate-Vitamin D (CALCIUM 500/D PO) Take  by mouth.     • aspirin EC (ECOTRIN) 81 MG Tablet Delayed Response Take 81 mg by mouth every day.     • pantoprazole (PROTONIX) 40 MG TBEC Take 40 mg by mouth every day.     • Probiotic Product (PROBIOTIC DAILY PO) Take  by mouth.     • Multiple Vitamins-Minerals (MULTIVITAMIN PO) Take  by mouth.     • vitamin D (CHOLECALCIFEROL) 1000 UNIT TABS Take 1,000 Units by mouth every day.       No current facility-administered medications for this visit.         Allergies   Allergen Reactions   • Amoxicillin-Pot Clavulanate Rash   • Augmentin    • Ciprofloxacin Rash   • Neomycin Rash         Family History   Problem Relation Age of Onset   • Heart Attack Mother 76   • Stroke Mother    • Other Father 52        Brain tumour          Social History     Socioeconomic History   • Marital status:      Spouse name: Not on file   • Number of children: Not on file   • Years of education: Not on file   • Highest education level: Not on file   Occupational History   • Not on file   Tobacco Use   • Smoking status: Never Smoker   • Smokeless tobacco: Never Used   Vaping Use   • Vaping Use: Never used   Substance and Sexual Activity   • Alcohol use: No     Alcohol/week: 0.0 oz   • Drug use: No   • Sexual activity: Never   Other Topics Concern   • Not on file   Social History Narrative   • Not on file     Social Determinants of Health     Financial Resource Strain:    • Difficulty of Paying Living Expenses:   "  Food Insecurity:    • Worried About Running Out of Food in the Last Year:    • Ran Out of Food in the Last Year:    Transportation Needs:    • Lack of Transportation (Medical):    • Lack of Transportation (Non-Medical):    Physical Activity:    • Days of Exercise per Week:    • Minutes of Exercise per Session:    Stress:    • Feeling of Stress :    Social Connections:    • Frequency of Communication with Friends and Family:    • Frequency of Social Gatherings with Friends and Family:    • Attends Spiritism Services:    • Active Member of Clubs or Organizations:    • Attends Club or Organization Meetings:    • Marital Status:    Intimate Partner Violence:    • Fear of Current or Ex-Partner:    • Emotionally Abused:    • Physically Abused:    • Sexually Abused:          Physical Exam:  Ambulatory Vitals  /60 (BP Location: Left arm, Patient Position: Sitting, BP Cuff Size: Adult)   Pulse 90   Resp 12   Ht 1.626 m (5' 4\")   Wt 80.3 kg (177 lb)   SpO2 97%    BP Readings from Last 4 Encounters:   09/28/21 136/60   02/09/21 144/60   01/22/20 146/88   05/09/19 140/60     Weight/BMI:   Vitals:    09/28/21 1315   BP: 136/60   Weight: 80.3 kg (177 lb)   Height: 1.626 m (5' 4\")    Body mass index is 30.38 kg/m².  Wt Readings from Last 4 Encounters:   09/28/21 80.3 kg (177 lb)   02/09/21 82.6 kg (182 lb)   01/22/20 83.5 kg (184 lb)   05/09/19 83.5 kg (184 lb)       Physical Exam  Constitutional:       General: She is not in acute distress.  HENT:      Head: Normocephalic and atraumatic.   Eyes:      Conjunctiva/sclera: Conjunctivae normal.      Pupils: Pupils are equal, round, and reactive to light.   Neck:      Vascular: No JVD.   Cardiovascular:      Rate and Rhythm: Normal rate and regular rhythm.      Heart sounds: Normal heart sounds. No murmur heard.   No friction rub. No gallop.    Pulmonary:      Effort: Pulmonary effort is normal. No respiratory distress.      Breath sounds: Normal breath sounds. No " wheezing or rales.   Chest:      Chest wall: No tenderness.   Abdominal:      General: Bowel sounds are normal. There is no distension.      Palpations: Abdomen is soft.   Musculoskeletal:      Cervical back: Normal range of motion and neck supple.   Skin:     General: Skin is warm and dry.   Neurological:      Mental Status: She is alert and oriented to person, place, and time.   Psychiatric:         Mood and Affect: Affect normal.         Judgment: Judgment normal.         Lab Data Review:  Lab Results   Component Value Date/Time    CHOLSTRLTOT 212 (H) 09/21/2021 08:56 AM     (H) 09/21/2021 08:56 AM    HDL 55 09/21/2021 08:56 AM    TRIGLYCERIDE 241 (H) 09/21/2021 08:56 AM       Lab Results   Component Value Date/Time    SODIUM 139 01/07/2021 08:05 AM    POTASSIUM 3.8 01/07/2021 08:05 AM    CHLORIDE 101 01/07/2021 08:05 AM    CO2 26 01/07/2021 08:05 AM    GLUCOSE 100 (H) 01/07/2021 08:05 AM    BUN 16 01/07/2021 08:05 AM    CREATININE 0.90 01/07/2021 08:05 AM     CrCl cannot be calculated (Patient's most recent lab result is older than the maximum 7 days allowed.).  Lab Results   Component Value Date/Time    ALKPHOSPHAT 88 01/07/2021 08:05 AM    ASTSGOT 24 01/07/2021 08:05 AM    ALTSGPT 14 01/07/2021 08:05 AM    TBILIRUBIN 0.4 01/07/2021 08:05 AM      Lab Results   Component Value Date/Time    WBC 7.8 01/07/2021 08:05 AM     Lab Results   Component Value Date/Time    HBA1C 5.5 11/06/2013 07:11 AM     No components found for: TROP      Cardiac Imaging and Procedures Review:      EKG 1/22/20 - sinus, first deg AVB, normal qtc    TTE 2/11/2020  CONCLUSIONS  Compared to the report of the prior study done 10/16/18 -there is been   no significant change.  Normal left ventricular chamber size.  Left ventricular ejection fraction is visually estimated to be 65%.  Indeterminate diastolic function.  No significant valve abnormalities.   Small pericardial effusion without evidence of hemodynamic compromise.  Left  Ventricle  Normal left ventricular chamber size. Normal left ventricular wall   thickness. Normal left ventricular systolic function. Left ventricular   ejection fraction is visually estimated to be 65%. Normal regional wall   motion. Indeterminate diastolic function.  Pericardium  Small pericardial effusion without evidence of hemodynamic compromise.    TTE 10/2018  CONCLUSIONS  Normal left ventricular systolic function.  Left ventricular ejection fraction is visually estimated to be 65%.  Mild mitral regurgitation.  Small pericardial effusion without evidence of hemodynamic compromise.  No Doppler evidence of patent foramen ovale.    Medical Decision Making:  Problem List Items Addressed This Visit     Hypertension    Pericardial effusion    Dyslipidemia    Relevant Orders    REFERRAL TO NUTRITION SERVICES    10 year risk of MI or stroke 7.5% or greater (intermediate)        Pericardial effusion - stable, small, asymptomatic.     HTN - improved. Goal 120/80. Continue HCTZ, lisinopril and amlodipine. Next agent spironolactone. Keep checking at home.    HLD / 10 year risk ascvd intermediate / CAC CT agatston 0 - refer to nutrition. Elevated lipids especially triglycerides. Has the potential to get off statin and even reduce antihypertensive burden.     It was my pleasure to meet with Ms. Gilmore.

## 2021-10-04 ENCOUNTER — OFFICE VISIT (OUTPATIENT)
Dept: MEDICAL GROUP | Facility: PHYSICIAN GROUP | Age: 71
End: 2021-10-04
Payer: MEDICARE

## 2021-10-04 VITALS
HEART RATE: 66 BPM | HEIGHT: 64 IN | DIASTOLIC BLOOD PRESSURE: 60 MMHG | WEIGHT: 179.5 LBS | SYSTOLIC BLOOD PRESSURE: 130 MMHG | TEMPERATURE: 97.4 F | BODY MASS INDEX: 30.64 KG/M2 | OXYGEN SATURATION: 98 %

## 2021-10-04 DIAGNOSIS — H35.3221 EXUDATIVE AGE-RELATED MACULAR DEGENERATION OF LEFT EYE WITH ACTIVE CHOROIDAL NEOVASCULARIZATION (HCC): ICD-10-CM

## 2021-10-04 DIAGNOSIS — E78.5 DYSLIPIDEMIA: ICD-10-CM

## 2021-10-04 DIAGNOSIS — Z11.59 NEED FOR HEPATITIS C SCREENING TEST: ICD-10-CM

## 2021-10-04 DIAGNOSIS — I31.39 PERICARDIAL EFFUSION: ICD-10-CM

## 2021-10-04 DIAGNOSIS — K21.9 GASTROESOPHAGEAL REFLUX DISEASE, UNSPECIFIED WHETHER ESOPHAGITIS PRESENT: ICD-10-CM

## 2021-10-04 DIAGNOSIS — K59.1 FUNCTIONAL DIARRHEA: ICD-10-CM

## 2021-10-04 DIAGNOSIS — I10 PRIMARY HYPERTENSION: ICD-10-CM

## 2021-10-04 DIAGNOSIS — F41.9 ANXIETY: ICD-10-CM

## 2021-10-04 PROCEDURE — 99214 OFFICE O/P EST MOD 30 MIN: CPT | Performed by: INTERNAL MEDICINE

## 2021-10-04 ASSESSMENT — FIBROSIS 4 INDEX: FIB4 SCORE: 1.64

## 2021-10-04 ASSESSMENT — PATIENT HEALTH QUESTIONNAIRE - PHQ9: CLINICAL INTERPRETATION OF PHQ2 SCORE: 0

## 2021-10-04 NOTE — ASSESSMENT & PLAN NOTE
Patient reports the only exacerbation she has is when she does dietary indiscretion with clear insight.  Continues on pantoprazole wonders if that could be contributing to the macular degeneration.

## 2021-10-04 NOTE — PROGRESS NOTES
Chief Complaint   Patient presents with   • Follow-Up       HISTORY OF PRESENT ILLNESS: Patient is a 71 y.o. female established patient who presents today to discuss the medical issues below.    Exudative age-related macular degeneration of left eye with active choroidal neovascularization (HCC)  Patient reports recent diagnosis of wet macular degeneration.      Hypertension  patient follows at home.  Wonders if green tea is a problem.    Pericardial effusion  Close [place] patient following with cardiology pericardial effusion felt to be minimal and asymptomatic.  Monitoring only currently.  Patient has no problems with chest pain palpitations or edema.    GERD (gastroesophageal reflux disease)  Patient reports the only exacerbation she has is when she does dietary indiscretion with clear insight.  Continues on pantoprazole wonders if that could be contributing to the macular degeneration.    Dyslipidemia  Patient continues on Pravachol 20 mg a day.  Admits to struggling with the Mediterranean diet recommended by ophthalmology and cardiology.    Functional diarrhea  Patient continues with GI, diagnosis is irritable bowel.  She has insight that stress exacerbates intermittent diarrhea episodes.    Anxiety  Patient reports she has had lots of anxiety this last year, helping to care for a dying friend.  She did attempt to contact counseling at last office visit however this was unsuccessful.  She has difficulty identifying stress management tools.      Patient Active Problem List    Diagnosis Date Noted   • Exudative age-related macular degeneration of left eye with active choroidal neovascularization (HCC) 10/04/2021   • 10 year risk of MI or stroke 7.5% or greater (intermediate) 01/22/2020   • Non-seasonal allergic rhinitis due to pollen 05/09/2019   • Osteoarthritis of both knees 05/09/2019   • Cervical dystonia 02/13/2019   • Functional diarrhea 02/13/2019   • Anxiety 02/13/2019   • Hypertension 04/17/2014   •  Pericardial effusion 2014   • Dyslipidemia 2014   • GERD (gastroesophageal reflux disease) 2014       Allergies:Amoxicillin-pot clavulanate, Augmentin, Ciprofloxacin, and Neomycin    Current Outpatient Medications   Medication Sig Dispense Refill   • Omega-3 Fatty Acids (FISH OIL) 1000 MG Cap capsule Take 1,000 mg by mouth 2 times a day.     • lisinopril (PRINIVIL) 40 MG tablet TAKE 1 TABLET BY MOUTH DAILY 90 tablet 3   • amLODIPine (NORVASC) 10 MG Tab TAKE 1 TABLET BY MOUTH EVERY DAY 90 tablet 3   • pravastatin (PRAVACHOL) 20 MG Tab TAKE 1 TABLET BY MOUTH EVERY NIGHT AT BEDTIME 90 tablet 3   • hydroCHLOROthiazide (HYDRODIURIL) 25 MG Tab Take 1 Tab by mouth every day. 90 Tab 3   • Multiple Vitamins-Minerals (ICAPS AREDS 2 PO)      • estradiol (VAGIFEM) 10 MCG Tab Insert 10 mcg into the vagina. TWICE Q WEEK.     • Calcium Carbonate-Vitamin D (CALCIUM 500/D PO) Take  by mouth.     • aspirin EC (ECOTRIN) 81 MG Tablet Delayed Response Take 81 mg by mouth every day.     • pantoprazole (PROTONIX) 40 MG TBEC Take 40 mg by mouth every day.     • Probiotic Product (PROBIOTIC DAILY PO) Take  by mouth.     • Multiple Vitamins-Minerals (MULTIVITAMIN PO) Take  by mouth.     • vitamin D (CHOLECALCIFEROL) 1000 UNIT TABS Take 1,000 Units by mouth every day.       No current facility-administered medications for this visit.         Past Medical History:   Diagnosis Date   • Cerumen debris on tympanic membrane of right ear 2016   • GERD (gastroesophageal reflux disease)    • WEBBER (nonalcoholic steatohepatitis)    • Vertigo 2017       Social History     Tobacco Use   • Smoking status: Never Smoker   • Smokeless tobacco: Never Used   Vaping Use   • Vaping Use: Never used   Substance Use Topics   • Alcohol use: No     Alcohol/week: 0.0 oz   • Drug use: No       Family Status   Relation Name Status   • Mo     • Fa     • Sis   at age 63        dementia     Family History   Problem  "Relation Age of Onset   • Heart Attack Mother 76   • Stroke Mother    • Other Father 52        Brain tumour        ROS:    Respiratory: Negative for cough, sputum production, shortness of breath or wheezing.    Cardiovascular: Negative for chest pain, palpitations, orthopnea, dyspnea with exertion or edema.   Gastrointestinal: Negative for GI upset, nausea, vomiting, abdominal pain, constipation or diarrhea.   Genitourinary: Negative for dysuria, urgency, hesitancy or frequency.       Exam:    /60   Pulse 66   Temp 36.3 °C (97.4 °F) (Temporal)   Ht 1.626 m (5' 4\")   Wt 81.4 kg (179 lb 8 oz)   SpO2 98%  Body mass index is 30.81 kg/m².  General:  Well nourished, well developed female in NAD.  Pulmonary: Clear to ausculation and percussion.  Normal effort. No rales, rhonchi, or wheezing.  Cardiovascular: Regular rate and rhythm without murmur.   Abdomen: Normal bowel sounds soft and nontender no palpable liver spleen bladder mass.  Extremities: No LE edema noted.  Neuro: Grossly nonfocal.  Psych: Alert and oriented to person, place, and time. Appropriate mood and conversation.    LABS: Results reviewed and discussed with the patient, questions answered.    This dictation was created using voice recognition software. I have made reasonable attempts to correct errors, however, errors of grammar and content may exist.          Assessment/Plan:    1. Exudative age-related macular degeneration of left eye with active choroidal neovascularization (HCC)  Encouragement support given.  Status post injection x1    2. Primary hypertension  Controlled with current medication regime  - Comp Metabolic Panel; Future  - CBC WITH DIFFERENTIAL; Future    3. Dyslipidemia  Reviewed again implications of diet exercise.  Continuing on pravastatin.  Has cardiology follow-up for consideration of additional medications if not responding.  - Lipid Profile; Future    4. Need for hepatitis C screening test  Discussed with patient  - " HEP C VIRUS ANTIBODY; Future    5. Pericardial effusion  Clinically stable    6. Gastroesophageal reflux disease, unspecified whether esophagitis present  Stable on medications and appraisal unlikely to be contributing to macular degeneration upon review on up-to-date.    7. Functional diarrhea  Stress management tools techniques discussed.  Continues to follow with GI.  Offered anxiety stress management referral to counseling she declines.  Monitor support    8. Anxiety  As above declining referral to behavioral health for now.  Discussed some tools for stress management and resources.  Support for now.    Patient was seen for 25 minutes face to face of which more than 50% of the time was spent in counseling and coordination of care regarding the above problems.

## 2021-10-04 NOTE — ASSESSMENT & PLAN NOTE
Patient continues on Pravachol 20 mg a day.  Admits to struggling with the Mediterranean diet recommended by ophthalmology and cardiology.

## 2021-10-04 NOTE — ASSESSMENT & PLAN NOTE
Patient reports she has had lots of anxiety this last year, helping to care for a dying friend.  She did attempt to contact counseling at last office visit however this was unsuccessful.  She has difficulty identifying stress management tools.

## 2021-10-04 NOTE — ASSESSMENT & PLAN NOTE
Patient continues with GI, diagnosis is irritable bowel.  She has insight that stress exacerbates intermittent diarrhea episodes.

## 2021-11-15 ENCOUNTER — HOSPITAL ENCOUNTER (OUTPATIENT)
Dept: RADIOLOGY | Facility: MEDICAL CENTER | Age: 71
End: 2021-11-15
Attending: OBSTETRICS & GYNECOLOGY
Payer: MEDICARE

## 2021-11-15 DIAGNOSIS — Z12.31 VISIT FOR SCREENING MAMMOGRAM: ICD-10-CM

## 2021-11-15 PROCEDURE — 77063 BREAST TOMOSYNTHESIS BI: CPT

## 2022-02-18 ENCOUNTER — HOSPITAL ENCOUNTER (OUTPATIENT)
Dept: LAB | Facility: MEDICAL CENTER | Age: 72
End: 2022-02-18
Attending: INTERNAL MEDICINE
Payer: MEDICARE

## 2022-02-18 DIAGNOSIS — I10 PRIMARY HYPERTENSION: ICD-10-CM

## 2022-02-18 DIAGNOSIS — E78.5 DYSLIPIDEMIA: ICD-10-CM

## 2022-02-18 LAB
ALBUMIN SERPL BCP-MCNC: 4.9 G/DL (ref 3.2–4.9)
ALBUMIN/GLOB SERPL: 1.9 G/DL
ALP SERPL-CCNC: 74 U/L (ref 30–99)
ALT SERPL-CCNC: 16 U/L (ref 2–50)
ANION GAP SERPL CALC-SCNC: 13 MMOL/L (ref 7–16)
AST SERPL-CCNC: 22 U/L (ref 12–45)
BILIRUB SERPL-MCNC: 0.5 MG/DL (ref 0.1–1.5)
BUN SERPL-MCNC: 19 MG/DL (ref 8–22)
CALCIUM SERPL-MCNC: 10.2 MG/DL (ref 8.5–10.5)
CHLORIDE SERPL-SCNC: 97 MMOL/L (ref 96–112)
CHOLEST SERPL-MCNC: 173 MG/DL (ref 100–199)
CO2 SERPL-SCNC: 27 MMOL/L (ref 20–33)
CREAT SERPL-MCNC: 1.06 MG/DL (ref 0.5–1.4)
FASTING STATUS PATIENT QL REPORTED: NORMAL
GLOBULIN SER CALC-MCNC: 2.6 G/DL (ref 1.9–3.5)
GLUCOSE SERPL-MCNC: 90 MG/DL (ref 65–99)
HDLC SERPL-MCNC: 63 MG/DL
LDLC SERPL CALC-MCNC: 82 MG/DL
POTASSIUM SERPL-SCNC: 3.7 MMOL/L (ref 3.6–5.5)
PROT SERPL-MCNC: 7.5 G/DL (ref 6–8.2)
SODIUM SERPL-SCNC: 137 MMOL/L (ref 135–145)
TRIGL SERPL-MCNC: 142 MG/DL (ref 0–149)

## 2022-02-18 PROCEDURE — 80053 COMPREHEN METABOLIC PANEL: CPT

## 2022-02-18 PROCEDURE — 85025 COMPLETE CBC W/AUTO DIFF WBC: CPT

## 2022-02-18 PROCEDURE — 36415 COLL VENOUS BLD VENIPUNCTURE: CPT

## 2022-02-18 PROCEDURE — 80061 LIPID PANEL: CPT

## 2022-02-19 LAB
BASOPHILS # BLD AUTO: 1.2 % (ref 0–1.8)
BASOPHILS # BLD: 0.08 K/UL (ref 0–0.12)
EOSINOPHIL # BLD AUTO: 0.2 K/UL (ref 0–0.51)
EOSINOPHIL NFR BLD: 3 % (ref 0–6.9)
ERYTHROCYTE [DISTWIDTH] IN BLOOD BY AUTOMATED COUNT: 42 FL (ref 35.9–50)
HCT VFR BLD AUTO: 44 % (ref 37–47)
HGB BLD-MCNC: 14.7 G/DL (ref 12–16)
IMM GRANULOCYTES # BLD AUTO: 0.02 K/UL (ref 0–0.11)
IMM GRANULOCYTES NFR BLD AUTO: 0.3 % (ref 0–0.9)
LYMPHOCYTES # BLD AUTO: 2.72 K/UL (ref 1–4.8)
LYMPHOCYTES NFR BLD: 40.4 % (ref 22–41)
MCH RBC QN AUTO: 28.4 PG (ref 27–33)
MCHC RBC AUTO-ENTMCNC: 33.4 G/DL (ref 33.6–35)
MCV RBC AUTO: 85.1 FL (ref 81.4–97.8)
MONOCYTES # BLD AUTO: 0.61 K/UL (ref 0–0.85)
MONOCYTES NFR BLD AUTO: 9.1 % (ref 0–13.4)
NEUTROPHILS # BLD AUTO: 3.11 K/UL (ref 2–7.15)
NEUTROPHILS NFR BLD: 46 % (ref 44–72)
NRBC # BLD AUTO: 0 K/UL
NRBC BLD-RTO: 0 /100 WBC
PLATELET # BLD AUTO: 275 K/UL (ref 164–446)
PMV BLD AUTO: 11.7 FL (ref 9–12.9)
RBC # BLD AUTO: 5.17 M/UL (ref 4.2–5.4)
WBC # BLD AUTO: 6.7 K/UL (ref 4.8–10.8)

## 2022-02-24 ENCOUNTER — OFFICE VISIT (OUTPATIENT)
Dept: MEDICAL GROUP | Facility: PHYSICIAN GROUP | Age: 72
End: 2022-02-24
Payer: MEDICARE

## 2022-02-24 VITALS
HEART RATE: 67 BPM | DIASTOLIC BLOOD PRESSURE: 68 MMHG | RESPIRATION RATE: 16 BRPM | BODY MASS INDEX: 30.01 KG/M2 | HEIGHT: 64 IN | SYSTOLIC BLOOD PRESSURE: 116 MMHG | OXYGEN SATURATION: 96 % | WEIGHT: 175.8 LBS | TEMPERATURE: 97.9 F

## 2022-02-24 DIAGNOSIS — I10 PRIMARY HYPERTENSION: ICD-10-CM

## 2022-02-24 DIAGNOSIS — K21.9 GASTROESOPHAGEAL REFLUX DISEASE, UNSPECIFIED WHETHER ESOPHAGITIS PRESENT: ICD-10-CM

## 2022-02-24 DIAGNOSIS — M72.2 PLANTAR FASCIITIS: ICD-10-CM

## 2022-02-24 DIAGNOSIS — H35.3221 EXUDATIVE AGE-RELATED MACULAR DEGENERATION OF LEFT EYE WITH ACTIVE CHOROIDAL NEOVASCULARIZATION (HCC): ICD-10-CM

## 2022-02-24 DIAGNOSIS — E78.5 DYSLIPIDEMIA: ICD-10-CM

## 2022-02-24 DIAGNOSIS — I31.39 PERICARDIAL EFFUSION: ICD-10-CM

## 2022-02-24 DIAGNOSIS — K59.1 FUNCTIONAL DIARRHEA: ICD-10-CM

## 2022-02-24 PROCEDURE — 99213 OFFICE O/P EST LOW 20 MIN: CPT | Performed by: NURSE PRACTITIONER

## 2022-02-24 ASSESSMENT — FIBROSIS 4 INDEX: FIB4 SCORE: 1.4

## 2022-02-24 ASSESSMENT — PATIENT HEALTH QUESTIONNAIRE - PHQ9: CLINICAL INTERPRETATION OF PHQ2 SCORE: 0

## 2022-02-24 NOTE — ASSESSMENT & PLAN NOTE
Patient is 71-year-old female who establish care with me today.  Managed by cardiology.  Taking pravastatin 20 mg daily.  Cholesterol levels have improved.  Patient reports that she has been working on low carbohydrate diet.  Exercises by doing water aerobics 4 times a week.

## 2022-02-24 NOTE — PATIENT INSTRUCTIONS
Plantar Fasciitis    Plantar fasciitis is a painful foot condition that affects the heel. It occurs when the band of tissue that connects the toes to the heel bone (plantar fascia) becomes irritated. This can happen as the result of exercising too much or doing other repetitive activities (overuse injury).  The pain from plantar fasciitis can range from mild irritation to severe pain that makes it difficult to walk or move. The pain is usually worse in the morning after sleeping, or after sitting or lying down for a while. Pain may also be worse after long periods of walking or standing.  What are the causes?  This condition may be caused by:  · Standing for long periods of time.  · Wearing shoes that do not have good arch support.  · Doing activities that put stress on joints (high-impact activities), including running, aerobics, and ballet.  · Being overweight.  · An abnormal way of walking (gait).  · Tight muscles in the back of your lower leg (calf).  · High arches in your feet.  · Starting a new athletic activity.  What are the signs or symptoms?  The main symptom of this condition is heel pain. Pain may:  · Be worse with first steps after a time of rest, especially in the morning after sleeping or after you have been sitting or lying down for a while.  · Be worse after long periods of standing still.  · Decrease after 30-45 minutes of activity, such as gentle walking.  How is this diagnosed?  This condition may be diagnosed based on your medical history and your symptoms. Your health care provider may ask questions about your activity level. Your health care provider will do a physical exam to check for:  · A tender area on the bottom of your foot.  · A high arch in your foot.  · Pain when you move your foot.  · Difficulty moving your foot.  You may have imaging tests to confirm the diagnosis, such as:  · X-rays.  · Ultrasound.  · MRI.  How is this treated?  Treatment for plantar fasciitis depends on how  severe your condition is. Treatment may include:  · Rest, ice, applying pressure (compression), and raising the affected foot (elevation). This may be called RICE therapy. Your health care provider may recommend RICE therapy along with over-the-counter pain medicines to manage your pain.  · Exercises to stretch your calves and your plantar fascia.  · A splint that holds your foot in a stretched, upward position while you sleep (night splint).  · Physical therapy to relieve symptoms and prevent problems in the future.  · Injections of steroid medicine (cortisone) to relieve pain and inflammation.  · Stimulating your plantar fascia with electrical impulses (extracorporeal shock wave therapy). This is usually the last treatment option before surgery.  · Surgery, if other treatments have not worked after 12 months.  Follow these instructions at home:    Managing pain, stiffness, and swelling  · If directed, put ice on the painful area:  ? Put ice in a plastic bag, or use a frozen bottle of water.  ? Place a towel between your skin and the bag or bottle.  ? Roll the bottom of your foot over the bag or bottle.  ? Do this for 20 minutes, 2-3 times a day.  · Wear athletic shoes that have air-sole or gel-sole cushions, or try wearing soft shoe inserts that are designed for plantar fasciitis.  · Raise (elevate) your foot above the level of your heart while you are sitting or lying down.  Activity  · Avoid activities that cause pain. Ask your health care provider what activities are safe for you.  · Do physical therapy exercises and stretches as told by your health care provider.  · Try activities and forms of exercise that are easier on your joints (low-impact). Examples include swimming, water aerobics, and biking.  General instructions  · Take over-the-counter and prescription medicines only as told by your health care provider.  · Wear a night splint while sleeping, if told by your health care provider. Loosen the splint  if your toes tingle, become numb, or turn cold and blue.  · Maintain a healthy weight, or work with your health care provider to lose weight as needed.  · Keep all follow-up visits as told by your health care provider. This is important.  Contact a health care provider if you:  · Have symptoms that do not go away after caring for yourself at home.  · Have pain that gets worse.  · Have pain that affects your ability to move or do your daily activities.  Summary  · Plantar fasciitis is a painful foot condition that affects the heel. It occurs when the band of tissue that connects the toes to the heel bone (plantar fascia) becomes irritated.  · The main symptom of this condition is heel pain that may be worse after exercising too much or standing still for a long time.  · Treatment varies, but it usually starts with rest, ice, compression, and elevation (RICE therapy) and over-the-counter medicines to manage pain.  This information is not intended to replace advice given to you by your health care provider. Make sure you discuss any questions you have with your health care provider.  Document Released: 09/12/2002 Document Revised: 11/30/2018 Document Reviewed: 10/15/2018  [employer] Patient Education © 2020 [employer]

## 2022-02-24 NOTE — ASSESSMENT & PLAN NOTE
Patient is 71-year-old female who establish care with me today.  Managed by gastroenterology.  Has follow-up appointment with him next week.  Working on treatment plan.

## 2022-02-24 NOTE — ASSESSMENT & PLAN NOTE
Patient is 71-year-old female who establish care with me today.  A small pericardial effusion, managed by cardiology.  Denies chest pain, shortness of breath.

## 2022-02-24 NOTE — ASSESSMENT & PLAN NOTE
Patient is 71-year-old female who establish care with me today.  Patient reports 3-month onset of bilateral heel pain, right worse than left.  Pain is severe when getting out of bed in the morning.  She wonders if she has plantar fasciitis, which she had 30 years ago.  Had steroid injection 30 years ago which was quite painful and did not help.  She has tried heel inserts, ibuprofen, ice.  Mildly improving.  Denies injury, erythema, edema, numbness.  She reports pain started after she increased her water aerobics from 2 days a week to 4 days a week.  She does balance on the balls of her feet a lot during the exercises.

## 2022-02-24 NOTE — ASSESSMENT & PLAN NOTE
Patient is 71-year-old female who establish care with me today.  Managed by gastroenterology, .  Taking Protonix 40 mg daily.  Has follow-up appointment with GI next week for diarrhea.

## 2022-02-24 NOTE — ASSESSMENT & PLAN NOTE
Patient is 71-year-old female who establish care with me today.  Taking lisinopril 40 mg and hydrochlorothiazide 25 mg and amlodipine 10 mg daily.  Managed by cardiology.  Exercising by doing water aerobics.  The patient denies chest pain, shortness of breath, headache, vision changes, epistaxis, or dyspnea on exertion.

## 2022-02-24 NOTE — PROGRESS NOTES
CC: Establish care, foot pain    HISTORY OF THE PRESENT ILLNESS: Patient is a 71 y.o. female. This pleasant patient is here today to establish care and for evaluation and management of the following health problems.    Patient's previous primary care provider is Dr. Best.    Health Maintenance: Declines bone density scan.  She would like to review further with her gynecologist.      GERD (gastroesophageal reflux disease)  Patient is 71-year-old female who establish care with me today.  Managed by gastroenterology, .  Taking Protonix 40 mg daily.  Has follow-up appointment with GI next week for diarrhea.      Pericardial effusion  Patient is 71-year-old female who establish care with me today.  A small pericardial effusion, managed by cardiology.  Denies chest pain, shortness of breath.    Hypertension  Patient is 71-year-old female who establish care with me today.  Taking lisinopril 40 mg and hydrochlorothiazide 25 mg and amlodipine 10 mg daily.  Managed by cardiology.  Exercising by doing water aerobics.  The patient denies chest pain, shortness of breath, headache, vision changes, epistaxis, or dyspnea on exertion.      Functional diarrhea  Patient is 71-year-old female who establish care with me today.  Managed by gastroenterology.  Has follow-up appointment with him next week.  Working on treatment plan.    Exudative age-related macular degeneration of left eye with active choroidal neovascularization (HCC)  Patient is 71-year-old female who establish care with me today.  Managed by ophthalmology.    Dyslipidemia  Patient is 71-year-old female who establish care with me today.  Managed by cardiology.  Taking pravastatin 20 mg daily.  Cholesterol levels have improved.  Patient reports that she has been working on low carbohydrate diet.  Exercises by doing water aerobics 4 times a week.    Plantar fasciitis  Patient is 71-year-old female who establish care with me today.  Patient reports 3-month  onset of bilateral heel pain, right worse than left.  Pain is severe when getting out of bed in the morning.  She wonders if she has plantar fasciitis, which she had 30 years ago.  Had steroid injection 30 years ago which was quite painful and did not help.  She has tried heel inserts, ibuprofen, ice.  Mildly improving.  Denies injury, erythema, edema, numbness.  She reports pain started after she increased her water aerobics from 2 days a week to 4 days a week.  She does balance on the balls of her feet a lot during the exercises.      Allergies: Amoxicillin-pot clavulanate, Augmentin, Ciprofloxacin, and Neomycin    Current Outpatient Medications Ordered in Epic   Medication Sig Dispense Refill   • Omega-3 Fatty Acids (FISH OIL) 1000 MG Cap capsule Take 1,000 mg by mouth 2 times a day.     • lisinopril (PRINIVIL) 40 MG tablet TAKE 1 TABLET BY MOUTH DAILY 90 tablet 3   • amLODIPine (NORVASC) 10 MG Tab TAKE 1 TABLET BY MOUTH EVERY DAY 90 tablet 3   • pravastatin (PRAVACHOL) 20 MG Tab TAKE 1 TABLET BY MOUTH EVERY NIGHT AT BEDTIME 90 tablet 3   • hydroCHLOROthiazide (HYDRODIURIL) 25 MG Tab Take 1 Tab by mouth every day. 90 Tab 3   • Multiple Vitamins-Minerals (ICAPS AREDS 2 PO)      • estradiol (VAGIFEM) 10 MCG Tab Insert 10 mcg into the vagina. TWICE Q WEEK.     • Calcium Carbonate-Vitamin D (CALCIUM 500/D PO) Take  by mouth.     • aspirin EC (ECOTRIN) 81 MG Tablet Delayed Response Take 81 mg by mouth every day.     • pantoprazole (PROTONIX) 40 MG TBEC Take 40 mg by mouth every day.     • Probiotic Product (PROBIOTIC DAILY PO) Take  by mouth.     • Multiple Vitamins-Minerals (MULTIVITAMIN PO) Take  by mouth.     • vitamin D (CHOLECALCIFEROL) 1000 UNIT TABS Take 1,000 Units by mouth every day.       No current [facility]-ordered facility-administered medications on file.       Past Medical History:   Diagnosis Date   • Cerumen debris on tympanic membrane of right ear 5/24/2016   • GERD (gastroesophageal reflux disease)    •  "WEBBER (nonalcoholic steatohepatitis)    • Vertigo 5/24/2017       Past Surgical History:   Procedure Laterality Date   • HYSTERECTOMY, TOTAL ABDOMINAL  96   • CARPAL TUNNEL RELEASE      right       Social History     Tobacco Use   • Smoking status: Never Smoker   • Smokeless tobacco: Never Used   Vaping Use   • Vaping Use: Never used   Substance Use Topics   • Alcohol use: No     Alcohol/week: 0.0 oz   • Drug use: No       Family History   Problem Relation Age of Onset   • Heart Attack Mother 76   • Stroke Mother    • Other Father 52        Brain tumour        ROS:   As in HPI, otherwise negative for chest pain, dyspnea, dysuria, blood in stool, fever           Exam: /68 (BP Location: Left arm, Patient Position: Sitting, BP Cuff Size: Adult)   Pulse 67   Temp 36.6 °C (97.9 °F) (Temporal)   Resp 16   Ht 1.626 m (5' 4\")   Wt 79.7 kg (175 lb 12.8 oz)   SpO2 96%  Body mass index is 30.18 kg/m².    General: Alert, pleasant, well nourished, well developed female in NAD  HEENT: Normocephalic. Eyes conjunctiva clear lids without ptosis, pupils equal and reactive to light, ears normal shape and contour, canals are clear bilaterally, tympanic membranes are pearly gray with good light reflex, nasal mucosa without erythema and drainage, oropharynx is without erythema, edema or exudates.   Neck: Supple without bruit. Thyroid is not enlarged.  Pulmonary: Clear to ausculation.  Normal effort. No rales, ronchi, or wheezing.  Cardiovascular: Normal rate and rhythm without murmur. Carotid and radial pulses are intact and equal bilaterally.  No lower extremity edema.  Abdomen: Soft, nontender, nondistended. Normal bowel sounds. Liver and spleen are not palpable  Neurologic: Grossly nonfocal  bilateral feet: No erythema or edema, pedal pulses palpable and equal bilaterally, right medial calcaneus moderately tender to palpation, full active range of motion.  Lymph: No cervical or supraclavicular lymph nodes are " palpable  Skin: Warm and dry.    Musculoskeletal: Normal gait.   Psych: Normal mood and affect. Alert and oriented. Judgment and insight is normal.    Please note that this dictation was created using voice recognition software. I have made every reasonable attempt to correct obvious errors, but I expect that there are errors of grammar and possibly content that I did not discover before finalizing the note.      Assessment/Plan  1. Gastroesophageal reflux disease, unspecified whether esophagitis present  well-controlled.  Continue with pantoprazole and follow-up with GI    2. Primary hypertension  well-controlled.  Continue with lisinopril and hydrochlorothiazide and amlodipine.  Continue follow-up with cardiology.  Lifestyle measures reviewed.  - Comp Metabolic Panel; Future  - ESTIMATED GFR; Future  - CBC WITH DIFFERENTIAL; Future    3. Dyslipidemia  well-controlled.  Continue with pravastatin.  Continue follow-up with cardiology.  - Lipid Profile; Future    4. Pericardial effusion  asymptomatic.  Continue follow-up with cardiology.    5. Functional diarrhea  currently being worked up for etiology of diarrhea.  Continue follow-up with GI.    6. Exudative age-related macular degeneration of left eye with active choroidal neovascularization (HCC)  continue follow-up with ophthalmology.    7. Plantar fasciitis  Advised patient on diagnosis of plantar fasciitis.  Advised on stretching prior to getting out of bed in the morning, ice massage, NSAIDs, boot to wear at night.  Handout on plantar fasciitis given to patient.  Advised to decrease water aerobics to twice a week temporarily.  She will let me know if she would like referral to podiatry.    Patient will return to clinic annually or sooner if needed.

## 2022-05-09 DIAGNOSIS — I10 ESSENTIAL HYPERTENSION: ICD-10-CM

## 2022-05-12 ENCOUNTER — TELEPHONE (OUTPATIENT)
Dept: CARDIOLOGY | Facility: MEDICAL CENTER | Age: 72
End: 2022-05-12
Payer: COMMERCIAL

## 2022-05-12 DIAGNOSIS — I10 ESSENTIAL HYPERTENSION: ICD-10-CM

## 2022-05-12 DIAGNOSIS — E78.5 DYSLIPIDEMIA: ICD-10-CM

## 2022-05-12 RX ORDER — AMLODIPINE BESYLATE 10 MG/1
10 TABLET ORAL DAILY
Qty: 120 TABLET | Refills: 0 | Status: SHIPPED | OUTPATIENT
Start: 2022-05-12 | End: 2022-09-08 | Stop reason: SDUPTHER

## 2022-05-12 RX ORDER — LISINOPRIL 40 MG/1
40 TABLET ORAL DAILY
Qty: 90 TABLET | Refills: 1 | Status: SHIPPED | OUTPATIENT
Start: 2022-05-12 | End: 2022-11-03

## 2022-05-12 NOTE — TELEPHONE ENCOUNTER
VR    Caller: Karolina Gilmore  Medication Name and Dosage: amLODIPine (NORVASC) 10 MG Tab [[id number]]  lisinopril (PRINIVIL) 40 MG tablet [[id number]]  Did patient contact pharmacy: yes  Medication amount left: 0 on the amlodipine and about 4 of the other  Preferred Pharmacy: [facility] on file  Other questions (Topic): N/A  Callback Number (Will only call for issues): [phone]    Than Luis pope

## 2022-05-13 RX ORDER — PRAVASTATIN SODIUM 20 MG
20 TABLET ORAL
Qty: 90 TABLET | Refills: 1 | Status: SHIPPED | OUTPATIENT
Start: 2022-05-13 | End: 2022-10-24

## 2022-05-13 RX ORDER — AMLODIPINE BESYLATE 10 MG/1
10 TABLET ORAL DAILY
Qty: 120 TABLET | Refills: 0 | OUTPATIENT
Start: 2022-05-13

## 2022-05-13 RX ORDER — LISINOPRIL 40 MG/1
40 TABLET ORAL DAILY
Qty: 90 TABLET | Refills: 3 | OUTPATIENT
Start: 2022-05-13

## 2022-09-08 ENCOUNTER — TELEPHONE (OUTPATIENT)
Dept: CARDIOLOGY | Facility: MEDICAL CENTER | Age: 72
End: 2022-09-08
Payer: COMMERCIAL

## 2022-09-08 DIAGNOSIS — I10 ESSENTIAL HYPERTENSION: ICD-10-CM

## 2022-09-08 RX ORDER — AMLODIPINE BESYLATE 10 MG/1
10 TABLET ORAL DAILY
Qty: 90 TABLET | Refills: 0 | Status: SHIPPED | OUTPATIENT
Start: 2022-09-08 | End: 2022-12-07

## 2022-09-08 NOTE — TELEPHONE ENCOUNTER
VR    Caller: Arnoldo Turcios    Medication Name and Dosage:    amLODIPine (NORVASC) 10 MG Tab [[id number]]    Did patient contact pharmacy (If no, please call pharmacy first): yes    Medication amount left: 0    Preferred Pharmacy:- [facility]    Other questions (Topic): N/A    Callback Number (Will only call for issues): [phone]    Thank you,   Renee COBOS

## 2022-10-24 ENCOUNTER — OFFICE VISIT (OUTPATIENT)
Dept: CARDIOLOGY | Facility: PHYSICIAN GROUP | Age: 72
End: 2022-10-24
Payer: MEDICARE

## 2022-10-24 VITALS
OXYGEN SATURATION: 97 % | HEART RATE: 62 BPM | RESPIRATION RATE: 14 BRPM | BODY MASS INDEX: 30.39 KG/M2 | SYSTOLIC BLOOD PRESSURE: 122 MMHG | WEIGHT: 178 LBS | HEIGHT: 64 IN | DIASTOLIC BLOOD PRESSURE: 66 MMHG

## 2022-10-24 DIAGNOSIS — I10 PRIMARY HYPERTENSION: ICD-10-CM

## 2022-10-24 DIAGNOSIS — E78.5 DYSLIPIDEMIA: ICD-10-CM

## 2022-10-24 DIAGNOSIS — R93.1 AGATSTON CAC SCORE, <100: ICD-10-CM

## 2022-10-24 DIAGNOSIS — Z91.89 10 YEAR RISK OF MI OR STROKE 7.5% OR GREATER: ICD-10-CM

## 2022-10-24 DIAGNOSIS — D69.2 SENILE PURPURA (HCC): ICD-10-CM

## 2022-10-24 PROCEDURE — 99214 OFFICE O/P EST MOD 30 MIN: CPT | Performed by: INTERNAL MEDICINE

## 2022-10-24 ASSESSMENT — ENCOUNTER SYMPTOMS
HEARTBURN: 0
WEIGHT GAIN: 0
WEIGHT LOSS: 0
FEVER: 0
ABDOMINAL PAIN: 0
CLAUDICATION: 0
DEPRESSION: 0
DYSPNEA ON EXERTION: 0
BACK PAIN: 0
CONSTIPATION: 0
SHORTNESS OF BREATH: 0
COUGH: 0
ORTHOPNEA: 0
FLANK PAIN: 0
ALTERED MENTAL STATUS: 0
IRREGULAR HEARTBEAT: 0
VOMITING: 0
DIZZINESS: 0
PND: 0
BLURRED VISION: 0
DIARRHEA: 0
DECREASED APPETITE: 0
NEAR-SYNCOPE: 0
NAUSEA: 0
SYNCOPE: 0
PALPITATIONS: 0

## 2022-10-24 ASSESSMENT — FIBROSIS 4 INDEX: FIB4 SCORE: 1.42

## 2022-10-24 NOTE — PROGRESS NOTES
Cardiology Note    Chief Complaint   Patient presents with    Hypertension     F/V Dx: Essential hypertension    Dyslipidemia    Pericardial Effusion     History of Present Illness: Karolina Gilmore is a 72 y.o. female PMH HTN, HLD, pericardial effusion who presents for follow up.    No active cardiac complaints. She remains active. Uses stationary bicycle and aquarobics. Compliant with medications and denies adverse effects.     Review of Systems   Constitutional: Negative for decreased appetite, fever, malaise/fatigue, weight gain and weight loss.   HENT:  Negative for congestion and nosebleeds.    Eyes:  Negative for blurred vision.   Cardiovascular:  Negative for chest pain, claudication, dyspnea on exertion, irregular heartbeat, leg swelling, near-syncope, orthopnea, palpitations, paroxysmal nocturnal dyspnea and syncope.   Respiratory:  Negative for cough and shortness of breath.    Endocrine: Negative for cold intolerance and heat intolerance.   Skin:  Negative for rash.   Musculoskeletal:  Negative for back pain.   Gastrointestinal:  Negative for abdominal pain, constipation, diarrhea, heartburn, melena, nausea and vomiting.   Genitourinary:  Negative for dysuria, flank pain and hematuria.   Neurological:  Negative for dizziness.   Psychiatric/Behavioral:  Negative for altered mental status and depression.        Past Medical History:   Diagnosis Date    Cerumen debris on tympanic membrane of right ear 5/24/2016    GERD (gastroesophageal reflux disease)     WBEBER (nonalcoholic steatohepatitis)     Vertigo 5/24/2017         Past Surgical History:   Procedure Laterality Date    HYSTERECTOMY, TOTAL ABDOMINAL  96    CARPAL TUNNEL RELEASE      right         Current Outpatient Medications   Medication Sig Dispense Refill    NON SPECIFIED Indications: [place] eye injections      amLODIPine (NORVASC) 10 MG Tab Take 1 Tablet by mouth every day. 90 Tablet 0    lisinopril (PRINIVIL) 40 MG tablet Take 1 Tablet by  mouth every day. 90 Tablet 1    hydroCHLOROthiazide (HYDRODIURIL) 25 MG Tab TAKE ONE TABLET BY MOUTH EVERY DAY 90 Tablet 2    Omega-3 Fatty Acids (FISH OIL) 1000 MG Cap capsule Take 1,000 mg by mouth 2 times a day.      Multiple Vitamins-Minerals (ICAPS AREDS 2 PO)       estradiol (VAGIFEM) 10 MCG Tab Insert 10 mcg into the vagina. TWICE Q WEEK.      Calcium Carbonate-Vitamin D (CALCIUM 500/D PO) Take  by mouth.      pantoprazole (PROTONIX) 40 MG TBEC Take 40 mg by mouth every day.      Probiotic Product (PROBIOTIC DAILY PO) Take  by mouth.      Multiple Vitamins-Minerals (MULTIVITAMIN PO) Take  by mouth.      vitamin D (CHOLECALCIFEROL) 1000 UNIT TABS Take 1,000 Units by mouth every day.       No current facility-administered medications for this visit.         Allergies   Allergen Reactions    Amoxicillin-Pot Clavulanate Rash    Augmentin     Ciprofloxacin Rash    Neomycin Rash         Family History   Problem Relation Age of Onset    Heart Attack Mother 76    Stroke Mother     Other Father 52        Brain tumour          Social History     Socioeconomic History    Marital status:      Spouse name: Not on file    Number of children: Not on file    Years of education: Not on file    Highest education level: Not on file   Occupational History    Not on file   Tobacco Use    Smoking status: Never    Smokeless tobacco: Never   Vaping Use    Vaping Use: Never used   Substance and Sexual Activity    Alcohol use: Not Currently    Drug use: No    Sexual activity: Never   Other Topics Concern    Not on file   Social History Narrative    Not on file     Social Determinants of Health     Financial Resource Strain: Not on file   Food Insecurity: Not on file   Transportation Needs: Not on file   Physical Activity: Not on file   Stress: Not on file   Social Connections: Not on file   Intimate Partner Violence: Not on file   Housing Stability: Not on file         Physical Exam:  Ambulatory Vitals  /66 (BP Location:  "Left arm, Patient Position: Sitting, BP Cuff Size: Adult)   Pulse 62   Resp 14   Ht 1.626 m (5' 4\")   Wt 80.7 kg (178 lb)   SpO2 97%    BP Readings from Last 4 Encounters:   10/24/22 122/66   02/24/22 116/68   10/04/21 130/60   09/28/21 136/60     Weight/BMI:   Vitals:    10/24/22 1323   BP: 122/66   Weight: 80.7 kg (178 lb)   Height: 1.626 m (5' 4\")    Body mass index is 30.55 kg/m².  Wt Readings from Last 4 Encounters:   10/24/22 80.7 kg (178 lb)   02/24/22 79.7 kg (175 lb 12.8 oz)   10/04/21 81.4 kg (179 lb 8 oz)   09/28/21 80.3 kg (177 lb)       Physical Exam  Constitutional:       General: She is not in acute distress.  HENT:      Head: Normocephalic and atraumatic.   Eyes:      Conjunctiva/sclera: Conjunctivae normal.      Pupils: Pupils are equal, round, and reactive to light.   Neck:      Vascular: No JVD.   Cardiovascular:      Rate and Rhythm: Normal rate and regular rhythm.      Heart sounds: Normal heart sounds. No murmur heard.    No friction rub. No gallop.   Pulmonary:      Effort: Pulmonary effort is normal. No respiratory distress.      Breath sounds: Normal breath sounds. No wheezing or rales.   Chest:      Chest wall: No tenderness.   Abdominal:      General: Bowel sounds are normal. There is no distension.      Palpations: Abdomen is soft.   Musculoskeletal:      Cervical back: Normal range of motion and neck supple.   Skin:     General: Skin is warm and dry.   Neurological:      Mental Status: She is alert and oriented to person, place, and time.   Psychiatric:         Mood and Affect: Affect normal.         Judgment: Judgment normal.       Lab Data Review:  Lab Results   Component Value Date/Time    CHOLSTRLTOT 173 02/18/2022 08:24 AM    LDL 82 02/18/2022 08:24 AM    HDL 63 02/18/2022 08:24 AM    TRIGLYCERIDE 142 02/18/2022 08:24 AM       Lab Results   Component Value Date/Time    SODIUM 137 02/18/2022 08:24 AM    POTASSIUM 3.7 02/18/2022 08:24 AM    CHLORIDE 97 02/18/2022 08:24 AM    CO2 " 27 02/18/2022 08:24 AM    GLUCOSE 90 02/18/2022 08:24 AM    BUN 19 02/18/2022 08:24 AM    CREATININE 1.06 02/18/2022 08:24 AM     CrCl cannot be calculated (Patient's most recent lab result is older than the maximum 7 days allowed.).  Lab Results   Component Value Date/Time    ALKPHOSPHAT 74 02/18/2022 08:24 AM    ASTSGOT 22 02/18/2022 08:24 AM    ALTSGPT 16 02/18/2022 08:24 AM    TBILIRUBIN 0.5 02/18/2022 08:24 AM      Lab Results   Component Value Date/Time    WBC 6.7 02/18/2022 08:24 AM     Lab Results   Component Value Date/Time    HBA1C 5.5 11/06/2013 07:11 AM     No components found for: TROP      Cardiac Imaging and Procedures Review:      EKG 1/22/20 - sinus, first deg AVB, normal qtc    CAC CT 8/2021  Coronary calcification:  LMA - 0.0  LCX - 0.0  LAD - 0.0  RCA - 0.0  PDA - 0.0  Total Calcium Score: 0.0    TTE 2/11/2020  CONCLUSIONS  Compared to the report of the prior study done 10/16/18 -there is been   no significant change.  Normal left ventricular chamber size.  Left ventricular ejection fraction is visually estimated to be 65%.  Indeterminate diastolic function.  No significant valve abnormalities.   Small pericardial effusion without evidence of hemodynamic compromise.  Left Ventricle  Normal left ventricular chamber size. Normal left ventricular wall   thickness. Normal left ventricular systolic function. Left ventricular   ejection fraction is visually estimated to be 65%. Normal regional wall   motion. Indeterminate diastolic function.  Pericardium  Small pericardial effusion without evidence of hemodynamic compromise.    TTE 10/2018  CONCLUSIONS  Normal left ventricular systolic function.  Left ventricular ejection fraction is visually estimated to be 65%.  Mild mitral regurgitation.  Small pericardial effusion without evidence of hemodynamic compromise.  No Doppler evidence of patent foramen ovale.    Medical Decision Making:  Problem List Items Addressed This Visit       Hypertension     Dyslipidemia    Relevant Orders    Lipid Profile    10 year risk of MI or stroke 7.5% or greater (intermediate)    Senile purpura (HCC)    Agatston CAC score, <100     Pericardial effusion - stable.    HTN - controlled. Goal 120/80. Continue HCTZ, lisinopril and amlodipine. Monitor at home.    HLD / 10 year risk ascvd intermediate / CAC CT agatston 0 - improved diet and exercise. Attempt d/c aspirin and statin. Repeat lipids in 3 months. If not high risk, can trend lipids annually and CAC CT at 5 year interval prior to statin.    It was my pleasure to meet with Ms. Gilmore.

## 2022-10-24 NOTE — PATIENT INSTRUCTIONS
If cholesterol elevated off statin can make follow up appointment with cardiology.  Can repeat coronary artery scoring at 5 year interval.

## 2022-11-02 DIAGNOSIS — I10 ESSENTIAL HYPERTENSION: ICD-10-CM

## 2022-11-03 RX ORDER — LISINOPRIL 40 MG/1
TABLET ORAL
Qty: 90 TABLET | Refills: 3 | Status: SHIPPED | OUTPATIENT
Start: 2022-11-03 | End: 2023-10-23

## 2022-11-03 NOTE — TELEPHONE ENCOUNTER
Is the patient due for a refill? Yes    Was the patient seen the past year? Yes    Date of last office visit: 10/24/22    Does the patient have an upcoming appointment?  No   If yes, When?     Provider to refill:vr    Does the patients insurance require a 100 day supply?  No

## 2022-11-15 DIAGNOSIS — I10 ESSENTIAL HYPERTENSION: ICD-10-CM

## 2022-11-16 ENCOUNTER — APPOINTMENT (OUTPATIENT)
Dept: RADIOLOGY | Facility: MEDICAL CENTER | Age: 72
End: 2022-11-16
Attending: NURSE PRACTITIONER
Payer: COMMERCIAL

## 2022-11-16 NOTE — TELEPHONE ENCOUNTER
Is the patient due for a refill? Yes    Was the patient seen the past year? Yes    Date of last office visit: 1024/22    Does the patient have an upcoming appointment?  No     Provider to refill:VR    Does the patients insurance require a 100 day supply?  No

## 2022-11-17 RX ORDER — HYDROCHLOROTHIAZIDE 25 MG/1
TABLET ORAL
Qty: 90 TABLET | Refills: 3 | Status: SHIPPED | OUTPATIENT
Start: 2022-11-17 | End: 2023-11-13

## 2022-11-21 DIAGNOSIS — I10 ESSENTIAL HYPERTENSION: ICD-10-CM

## 2022-11-22 NOTE — TELEPHONE ENCOUNTER
Is the patient due for a refill? No    Was the patient seen the past year? Yes    Date of last office visit: 10/24/2022    Does the patient have an upcoming appointment?  No       Provider to refill:VR    Does the patients insurance require a 100 day supply?  No      hydroCHLOROthiazide (HYDRODIURIL) 25 MG Tab 90 Tablet 3/3 11/17/2022

## 2022-11-23 RX ORDER — HYDROCHLOROTHIAZIDE 25 MG/1
TABLET ORAL
Qty: 90 TABLET | Refills: 0 | OUTPATIENT
Start: 2022-11-23

## 2022-11-29 NOTE — TELEPHONE ENCOUNTER
Is the patient due for a refill? No- discontinued by VR per pt request     Was the patient seen the past year? Yes    Date of last office visit: 10/24/2022    Does the patient have an upcoming appointment?  No   If yes, When?     Provider to refill:VR    Does the patients insurance require a 100 day supply?  No

## 2022-11-30 RX ORDER — PRAVASTATIN SODIUM 20 MG
TABLET ORAL
Qty: 90 TABLET | Refills: 3 | OUTPATIENT
Start: 2022-11-30

## 2022-12-04 DIAGNOSIS — I10 ESSENTIAL HYPERTENSION: ICD-10-CM

## 2022-12-05 NOTE — TELEPHONE ENCOUNTER
Is the patient due for a refill? Yes    Was the patient seen the past year? Yes    Date of last office visit: 10/24/22    Does the patient have an upcoming appointment?  No       Provider to refill:VR    Does the patients insurance require a 100 day supply?  No

## 2022-12-07 RX ORDER — AMLODIPINE BESYLATE 10 MG/1
TABLET ORAL
Qty: 90 TABLET | Refills: 3 | Status: SHIPPED | OUTPATIENT
Start: 2022-12-07 | End: 2023-11-21

## 2023-01-10 ENCOUNTER — APPOINTMENT (OUTPATIENT)
Dept: RADIOLOGY | Facility: MEDICAL CENTER | Age: 73
End: 2023-01-10
Attending: NURSE PRACTITIONER
Payer: COMMERCIAL

## 2023-01-24 ENCOUNTER — HOSPITAL ENCOUNTER (OUTPATIENT)
Dept: LAB | Facility: MEDICAL CENTER | Age: 73
End: 2023-01-24
Attending: NURSE PRACTITIONER
Payer: MEDICARE

## 2023-01-24 DIAGNOSIS — E78.5 DYSLIPIDEMIA: ICD-10-CM

## 2023-01-24 DIAGNOSIS — I10 PRIMARY HYPERTENSION: ICD-10-CM

## 2023-01-24 LAB
ALBUMIN SERPL BCP-MCNC: 4.7 G/DL (ref 3.2–4.9)
ALBUMIN/GLOB SERPL: 1.6 G/DL
ALP SERPL-CCNC: 79 U/L (ref 30–99)
ALT SERPL-CCNC: 14 U/L (ref 2–50)
ANION GAP SERPL CALC-SCNC: 13 MMOL/L (ref 7–16)
AST SERPL-CCNC: 21 U/L (ref 12–45)
BASOPHILS # BLD AUTO: 1.2 % (ref 0–1.8)
BASOPHILS # BLD: 0.08 K/UL (ref 0–0.12)
BILIRUB SERPL-MCNC: 0.5 MG/DL (ref 0.1–1.5)
BUN SERPL-MCNC: 19 MG/DL (ref 8–22)
CALCIUM ALBUM COR SERPL-MCNC: 9.4 MG/DL (ref 8.5–10.5)
CALCIUM SERPL-MCNC: 10 MG/DL (ref 8.5–10.5)
CHLORIDE SERPL-SCNC: 97 MMOL/L (ref 96–112)
CHOLEST SERPL-MCNC: 269 MG/DL (ref 100–199)
CO2 SERPL-SCNC: 27 MMOL/L (ref 20–33)
CREAT SERPL-MCNC: 1.07 MG/DL (ref 0.5–1.4)
EOSINOPHIL # BLD AUTO: 0.22 K/UL (ref 0–0.51)
EOSINOPHIL NFR BLD: 3.4 % (ref 0–6.9)
ERYTHROCYTE [DISTWIDTH] IN BLOOD BY AUTOMATED COUNT: 42.1 FL (ref 35.9–50)
FASTING STATUS PATIENT QL REPORTED: NORMAL
GFR SERPLBLD CREATININE-BSD FMLA CKD-EPI: 55 ML/MIN/1.73 M 2
GLOBULIN SER CALC-MCNC: 3 G/DL (ref 1.9–3.5)
GLUCOSE SERPL-MCNC: 104 MG/DL (ref 65–99)
HCT VFR BLD AUTO: 45.4 % (ref 37–47)
HDLC SERPL-MCNC: 58 MG/DL
HGB BLD-MCNC: 15.1 G/DL (ref 12–16)
IMM GRANULOCYTES # BLD AUTO: 0.01 K/UL (ref 0–0.11)
IMM GRANULOCYTES NFR BLD AUTO: 0.2 % (ref 0–0.9)
LDLC SERPL CALC-MCNC: 174 MG/DL
LYMPHOCYTES # BLD AUTO: 3.2 K/UL (ref 1–4.8)
LYMPHOCYTES NFR BLD: 49.5 % (ref 22–41)
MCH RBC QN AUTO: 28.7 PG (ref 27–33)
MCHC RBC AUTO-ENTMCNC: 33.3 G/DL (ref 33.6–35)
MCV RBC AUTO: 86.1 FL (ref 81.4–97.8)
MONOCYTES # BLD AUTO: 0.61 K/UL (ref 0–0.85)
MONOCYTES NFR BLD AUTO: 9.4 % (ref 0–13.4)
NEUTROPHILS # BLD AUTO: 2.35 K/UL (ref 2–7.15)
NEUTROPHILS NFR BLD: 36.3 % (ref 44–72)
NRBC # BLD AUTO: 0 K/UL
NRBC BLD-RTO: 0 /100 WBC
PLATELET # BLD AUTO: 298 K/UL (ref 164–446)
PMV BLD AUTO: 11 FL (ref 9–12.9)
POTASSIUM SERPL-SCNC: 3.8 MMOL/L (ref 3.6–5.5)
PROT SERPL-MCNC: 7.7 G/DL (ref 6–8.2)
RBC # BLD AUTO: 5.27 M/UL (ref 4.2–5.4)
SODIUM SERPL-SCNC: 137 MMOL/L (ref 135–145)
TRIGL SERPL-MCNC: 187 MG/DL (ref 0–149)
WBC # BLD AUTO: 6.5 K/UL (ref 4.8–10.8)

## 2023-01-24 PROCEDURE — 80061 LIPID PANEL: CPT

## 2023-01-24 PROCEDURE — 36415 COLL VENOUS BLD VENIPUNCTURE: CPT

## 2023-01-24 PROCEDURE — 85025 COMPLETE CBC W/AUTO DIFF WBC: CPT

## 2023-01-24 PROCEDURE — 80053 COMPREHEN METABOLIC PANEL: CPT

## 2023-01-31 ENCOUNTER — PATIENT MESSAGE (OUTPATIENT)
Dept: CARDIOLOGY | Facility: PHYSICIAN GROUP | Age: 73
End: 2023-01-31
Payer: COMMERCIAL

## 2023-02-07 NOTE — PATIENT COMMUNICATION
To VR: please review latest lipid results from 1/24. Please advise if you have any recommendations. Not currently on statin. Thank you!

## 2023-02-21 ENCOUNTER — OFFICE VISIT (OUTPATIENT)
Dept: MEDICAL GROUP | Facility: PHYSICIAN GROUP | Age: 73
End: 2023-02-21
Payer: MEDICARE

## 2023-02-21 VITALS
TEMPERATURE: 97.4 F | WEIGHT: 169 LBS | SYSTOLIC BLOOD PRESSURE: 120 MMHG | BODY MASS INDEX: 28.85 KG/M2 | HEIGHT: 64 IN | OXYGEN SATURATION: 96 % | RESPIRATION RATE: 18 BRPM | HEART RATE: 66 BPM | DIASTOLIC BLOOD PRESSURE: 60 MMHG

## 2023-02-21 DIAGNOSIS — E78.5 DYSLIPIDEMIA: ICD-10-CM

## 2023-02-21 DIAGNOSIS — F41.9 ANXIETY: ICD-10-CM

## 2023-02-21 DIAGNOSIS — H35.30 MACULAR DEGENERATION OF BOTH EYES, UNSPECIFIED TYPE: ICD-10-CM

## 2023-02-21 PROCEDURE — 99214 OFFICE O/P EST MOD 30 MIN: CPT | Performed by: NURSE PRACTITIONER

## 2023-02-21 RX ORDER — LORAZEPAM 0.5 MG/1
0.5 TABLET ORAL EVERY 8 HOURS PRN
Qty: 15 TABLET | Refills: 0 | Status: SHIPPED | OUTPATIENT
Start: 2023-02-21 | End: 2023-05-23 | Stop reason: SDUPTHER

## 2023-02-21 ASSESSMENT — PATIENT HEALTH QUESTIONNAIRE - PHQ9: CLINICAL INTERPRETATION OF PHQ2 SCORE: 0

## 2023-02-21 ASSESSMENT — FIBROSIS 4 INDEX: FIB4 SCORE: 1.34

## 2023-02-21 NOTE — ASSESSMENT & PLAN NOTE
Chronic health problem, uncontrolled. The 10-year ASCVD risk score (Sreedhar AGRAWAL, et al., 2019) is: 13.2%   Discontinued pravastatin approximately 4 months ago after CT cardiac score of 0.  Managed by cardiology.  Cardiology recommended continuing with exercising 5 days a week and to work on low-fat diet lipid profile in 3 to 4 months.  Patient reports she was already exercising 5 days a week on treadmill and at the pool.  However, she could improve on her diet.

## 2023-02-21 NOTE — ASSESSMENT & PLAN NOTE
Patient reports she is having severe anxiety prior to eye injections for macular degeneration.  We will get diarrhea the day of.  Has to take Imodium that morning.  Wondering if she can take a medicine like Valium prior to eye injections.  She has a  on those days.

## 2023-02-21 NOTE — ASSESSMENT & PLAN NOTE
Patient reports macular degeneration.  Needing to get eye injections every month.  Very anxiety provoking.  Please see additional notes under anxiety.

## 2023-02-21 NOTE — PROGRESS NOTES
CC: Lab review, anxiety    HISTORY OF THE PRESENT ILLNESS: Patient is a 72 y.o. female. This pleasant patient is here today for evaluation and management of the following health problems.    Health Maintenance: Patient declined flu vaccine, though we reviewed benefits of flu vaccine.  Encouraged him to wash his hands frequently and stay out of environments with people who are ill.        Dyslipidemia  Chronic health problem, uncontrolled. The 10-year ASCVD risk score (Sreedhar AGRAWAL, et al., 2019) is: 13.2%   Discontinued pravastatin approximately 4 months ago after CT cardiac score of 0.  Managed by cardiology.  Cardiology recommended continuing with exercising 5 days a week and to work on low-fat diet lipid profile in 3 to 4 months.  Patient reports she was already exercising 5 days a week on treadmill and at the pool.  However, she could improve on her diet.      Macular degeneration  Patient reports macular degeneration.  Needing to get eye injections every month.  Very anxiety provoking.  Please see additional notes under anxiety.    Anxiety  Patient reports she is having severe anxiety prior to eye injections for macular degeneration.  We will get diarrhea the day of.  Has to take Imodium that morning.  Wondering if she can take a medicine like Valium prior to eye injections.  She has a  on those days.    Allergies: Amoxicillin-pot clavulanate, Augmentin, Ciprofloxacin, and Neomycin    Current Outpatient Medications Ordered in Epic   Medication Sig Dispense Refill    LORazepam (ATIVAN) 0.5 MG Tab Take 1 Tablet by mouth every 8 hours as needed for Anxiety for up to 30 days. Indications: Feeling Anxious 15 Tablet 0    amLODIPine (NORVASC) 10 MG Tab TAKE ONE TABLET BY MOUTH ONE TIME DAILY 90 Tablet 3    hydroCHLOROthiazide (HYDRODIURIL) 25 MG Tab TAKE ONE TABLET BY MOUTH ONE TIME DAILY 90 Tablet 3    lisinopril (PRINIVIL) 40 MG tablet TAKE ONE TABLET BY MOUTH ONE TIME DAILY 90 Tablet 3    NON SPECIFIED  "Indications: avistan eye injections      Omega-3 Fatty Acids (FISH OIL) 1000 MG Cap capsule Take 1,000 mg by mouth 2 times a day.      Multiple Vitamins-Minerals (ICAPS AREDS 2 PO)       estradiol (VAGIFEM) 10 MCG Tab Insert 10 mcg into the vagina. TWICE Q WEEK.      Calcium Carbonate-Vitamin D (CALCIUM 500/D PO) Take  by mouth.      pantoprazole (PROTONIX) 40 MG TBEC Take 40 mg by mouth every day.      Probiotic Product (PROBIOTIC DAILY PO) Take  by mouth.      Multiple Vitamins-Minerals (MULTIVITAMIN PO) Take  by mouth.      vitamin D (CHOLECALCIFEROL) 1000 UNIT TABS Take 1,000 Units by mouth every day.       No current [facility]-ordered facility-administered medications on file.       Past Medical History:   Diagnosis Date    Cerumen debris on tympanic membrane of right ear 5/24/2016    GERD (gastroesophageal reflux disease)     WEBBER (nonalcoholic steatohepatitis)     Vertigo 5/24/2017       Past Surgical History:   Procedure Laterality Date    HYSTERECTOMY, TOTAL ABDOMINAL  96    CARPAL TUNNEL RELEASE      right       Social History     Tobacco Use    Smoking status: Never    Smokeless tobacco: Never   Vaping Use    Vaping Use: Never used   Substance Use Topics    Alcohol use: Not Currently    Drug use: No       Family History   Problem Relation Age of Onset    Heart Attack Mother 76    Stroke Mother     Other Father 52        Brain tumour        ROS: As in HPI, otherwise negative for chest pain, dyspnea, abdominal pain, dysuria, blood in stool, fever          Exam: /60 (BP Location: Left arm)   Pulse 66   Temp 36.3 °C (97.4 °F) (Temporal)   Resp 18   Ht 1.626 m (5' 4\")   Wt 76.7 kg (169 lb)   SpO2 96%  Body mass index is 29.01 kg/m².    General: Alert, pleasant, well nourished, well developed female in NAD  HEENT: Normocephalic. Eyes conjunctiva clear lids without ptosis, canals are clear bilaterally, tympanic membranes are pearly gray with good light reflex, nasal mucosa without erythema and drainage, " oropharynx is without erythema, edema or exudates.   Neck: Supple without bruit. Thyroid is not enlarged.  Pulmonary: Clear to ausculation.  Normal effort. No rales, ronchi, or wheezing.  Cardiovascular: Normal rate and rhythm without murmur. Carotid and radial pulses are intact and equal bilaterally.  No lower extremity edema.  Abdomen: Soft, nontender, nondistended. Normal bowel sounds. Liver and spleen are not palpable  Neurologic: Grossly nonfocal  Lymph: No cervical or supraclavicular lymph nodes are palpable  Skin: Warm and dry.    Musculoskeletal: Normal gait.   Psych: Normal mood and affect. Alert and oriented. Judgment and insight is normal.    Please note that this dictation was created using voice recognition software. I have made every reasonable attempt to correct obvious errors, but I expect that there are errors of grammar and possibly content that I did not discover before finalizing the note.      Assessment/Plan  1. Dyslipidemia  We will trial working on diet.  We will continue with routine aerobic exercise.  We will get lipid profile in 3 months.  Patient will return to clinic at that time and consider restarting pravastatin pending results.  - Lipid Profile; Future    2. Anxiety  Patient having severe anxiety prior to eye injections.  She has not talked to her ophthalmologist about this yet.  I would like her to make sure with ophthalmology that it is okay if she takes lorazepam prior to procedure.  Patient verbalizes understanding.  Risks and side effects of lorazepam reviewed with patient.  She understands to have a  when she takes lorazepam.  Knows not to drink alcohol while taking medication.   reviewed.  Patient signed controlled substance agreement today.  - LORazepam (ATIVAN) 0.5 MG Tab; Take 1 Tablet by mouth every 8 hours as needed for Anxiety for up to 30 days. Indications: Feeling Anxious  Dispense: 15 Tablet; Refill: 0  - Controlled Substance Treatment Agreement    3. Macular  degeneration of both eyes, unspecified type  Continue follow-up with ophthalmology.    Patient will return to clinic in 3 months for lab review and follow-up on anxiety or sooner if needed.    My total time spent caring for the patient on the day of the encounter was 30 minutes.   This does not include time spent on separately billable procedures/tests.

## 2023-02-22 ENCOUNTER — TELEPHONE (OUTPATIENT)
Dept: CARDIOLOGY | Facility: MEDICAL CENTER | Age: 73
End: 2023-02-22
Payer: COMMERCIAL

## 2023-02-22 NOTE — TELEPHONE ENCOUNTER
VR    Caller: Itz with [facility]    Topic/issue: Itz is calling to let us know they made an error on the dispensing of the   hydroCHLOROthiazide (HYDRODIURIL) 25 MG Tab [[id number]].  In Novemeber    The error was they gave the Pt 50mg 1 x a day instead of the  25mg 1 x a day. The error was corrected on 02/20/2023.     Call Back: [phone] (Itz)    Thank you,  Agnieszka STEVENS

## 2023-03-23 ENCOUNTER — HOSPITAL ENCOUNTER (OUTPATIENT)
Dept: RADIOLOGY | Facility: MEDICAL CENTER | Age: 73
End: 2023-03-23
Attending: NURSE PRACTITIONER
Payer: MEDICARE

## 2023-03-23 DIAGNOSIS — Z12.31 VISIT FOR SCREENING MAMMOGRAM: ICD-10-CM

## 2023-03-23 PROCEDURE — 77063 BREAST TOMOSYNTHESIS BI: CPT

## 2023-05-16 ENCOUNTER — HOSPITAL ENCOUNTER (OUTPATIENT)
Dept: LAB | Facility: MEDICAL CENTER | Age: 73
End: 2023-05-16
Attending: NURSE PRACTITIONER
Payer: MEDICARE

## 2023-05-16 DIAGNOSIS — E78.5 DYSLIPIDEMIA: ICD-10-CM

## 2023-05-16 LAB
CHOLEST SERPL-MCNC: 265 MG/DL (ref 100–199)
FASTING STATUS PATIENT QL REPORTED: NORMAL
HDLC SERPL-MCNC: 58 MG/DL
LDLC SERPL CALC-MCNC: 159 MG/DL
TRIGL SERPL-MCNC: 241 MG/DL (ref 0–149)

## 2023-05-16 PROCEDURE — 80061 LIPID PANEL: CPT

## 2023-05-16 PROCEDURE — 36415 COLL VENOUS BLD VENIPUNCTURE: CPT

## 2023-05-23 ENCOUNTER — OFFICE VISIT (OUTPATIENT)
Dept: MEDICAL GROUP | Facility: PHYSICIAN GROUP | Age: 73
End: 2023-05-23
Payer: MEDICARE

## 2023-05-23 VITALS
OXYGEN SATURATION: 97 % | RESPIRATION RATE: 16 BRPM | SYSTOLIC BLOOD PRESSURE: 120 MMHG | HEIGHT: 64 IN | BODY MASS INDEX: 29.33 KG/M2 | WEIGHT: 171.8 LBS | HEART RATE: 64 BPM | TEMPERATURE: 97.3 F | DIASTOLIC BLOOD PRESSURE: 68 MMHG

## 2023-05-23 DIAGNOSIS — E78.5 DYSLIPIDEMIA: ICD-10-CM

## 2023-05-23 DIAGNOSIS — Z78.0 POST-MENOPAUSAL: ICD-10-CM

## 2023-05-23 DIAGNOSIS — H35.30 MACULAR DEGENERATION OF BOTH EYES, UNSPECIFIED TYPE: ICD-10-CM

## 2023-05-23 DIAGNOSIS — F41.9 ANXIETY: ICD-10-CM

## 2023-05-23 PROCEDURE — 3078F DIAST BP <80 MM HG: CPT | Performed by: NURSE PRACTITIONER

## 2023-05-23 PROCEDURE — 99214 OFFICE O/P EST MOD 30 MIN: CPT | Performed by: NURSE PRACTITIONER

## 2023-05-23 PROCEDURE — 3074F SYST BP LT 130 MM HG: CPT | Performed by: NURSE PRACTITIONER

## 2023-05-23 RX ORDER — LORAZEPAM 0.5 MG/1
0.5 TABLET ORAL EVERY 8 HOURS PRN
Qty: 15 TABLET | Refills: 0 | Status: SHIPPED | OUTPATIENT
Start: 2023-05-23 | End: 2023-06-22

## 2023-05-23 ASSESSMENT — FIBROSIS 4 INDEX: FIB4 SCORE: 1.36

## 2023-05-23 NOTE — PROGRESS NOTES
CC:  med refill, lab review    HISTORY OF THE PRESENT ILLNESS: Patient is a 73 y.o. female. This pleasant patient is here today for evaluation and management of the following health problems.    Health Maintenance:  patient declines pnuem vac    Dyslipidemia  Cholesterol levels still elevated.  Cardiac CT scoring was 0, so discontinued pravastatin over 6 months ago.  Exercising, but could improve on diet.  Cardiology wanted to see patient if lipids show high risk.  She will schedule follow-up appointment.    Component      Latest Ref Rng 5/16/2023   Cholesterol,Tot      100 - 199 mg/dL 265 (H)    Triglycerides      0 - 149 mg/dL 241 (H)    HDL      >=40 mg/dL 58    LDL      <100 mg/dL 159 (H)             Macular degeneration  Continues with injections 2-4 times a month.  We will need to do for the rest of her life.  Continues with anxiety, improved control with lorazepam prior to appointments.  Please see additional notes under anxiety.    Anxiety  Well-controlled with lorazepam half of 0.5 mg tab prior to traveling to [place] for eye injections.  Has a  on those days.  Denies adverse effects.  Requesting refill today.  Also discussed other options including counseling.  Patient reports counseling may be beneficial except she would have to drive to [place] which causes anxiety.  Discussed virtual appointments, patient will consider.  Also advised on meditation apps/CDs on the way to [place].    Allergies: Amoxicillin-pot clavulanate, Augmentin, Ciprofloxacin, and Neomycin    Current Outpatient Medications Ordered in Epic   Medication Sig Dispense Refill    LORazepam (ATIVAN) 0.5 MG Tab Take 1 Tablet by mouth every 8 hours as needed for Anxiety for up to 30 days. Indications: Feeling Anxious 15 Tablet 0    amLODIPine (NORVASC) 10 MG Tab TAKE ONE TABLET BY MOUTH ONE TIME DAILY 90 Tablet 3    hydroCHLOROthiazide (HYDRODIURIL) 25 MG Tab TAKE ONE TABLET BY MOUTH ONE TIME DAILY 90 Tablet 3    lisinopril (PRINIVIL) 40 MG  "tablet TAKE ONE TABLET BY MOUTH ONE TIME DAILY 90 Tablet 3    NON SPECIFIED Eyleya eye injections one eye at a time once a month  Indications: avistan eye injections      Omega-3 Fatty Acids (FISH OIL) 1000 MG Cap capsule Take 1,000 mg by mouth 2 times a day.      Multiple Vitamins-Minerals (ICAPS AREDS 2 PO)       estradiol (VAGIFEM) 10 MCG Tab Insert 10 mcg into the vagina. TWICE Q WEEK.      Calcium Carbonate-Vitamin D (CALCIUM 500/D PO) Take  by mouth.      pantoprazole (PROTONIX) 40 MG TBEC Take 40 mg by mouth every day.      Probiotic Product (PROBIOTIC DAILY PO) Take  by mouth.      Multiple Vitamins-Minerals (MULTIVITAMIN PO) Take  by mouth.      vitamin D (CHOLECALCIFEROL) 1000 UNIT TABS Take 1,000 Units by mouth every day.       No current [facility]-ordered facility-administered medications on file.       Past Medical History:   Diagnosis Date    Cerumen debris on tympanic membrane of right ear 5/24/2016    GERD (gastroesophageal reflux disease)     WEBBER (nonalcoholic steatohepatitis)     Vertigo 5/24/2017       Past Surgical History:   Procedure Laterality Date    HYSTERECTOMY, TOTAL ABDOMINAL  96    CARPAL TUNNEL RELEASE      right       Social History     Tobacco Use    Smoking status: Never    Smokeless tobacco: Never   Vaping Use    Vaping Use: Never used   Substance Use Topics    Alcohol use: Not Currently    Drug use: No       Family History   Problem Relation Age of Onset    Heart Attack Mother 76    Stroke Mother     Other Father 52        Brain tumour        ROS: As in HPI, otherwise negative for chest pain, dyspnea, abdominal pain, dysuria, blood in stool, fever           Exam: /68   Pulse 64   Temp 36.3 °C (97.3 °F) (Temporal)   Resp 16   Ht 1.626 m (5' 4\")   Wt 77.9 kg (171 lb 12.8 oz)   SpO2 97%  Body mass index is 29.49 kg/m².    General: Alert, pleasant, well nourished, well developed female in NAD  HEENT: Normocephalic. Eyes conjunctiva clear lids without ptosis  Neck: Supple " without bruit. Thyroid is not enlarged.  Pulmonary: Clear to ausculation.  Normal effort. No rales, ronchi, or wheezing.  Cardiovascular: Normal rate and rhythm without murmur.   Neurologic: Grossly nonfocal  Lymph: No cervical or supraclavicular lymph nodes are palpable  Skin: Warm and dry.    Psych: Normal mood and affect. Alert and oriented. Judgment and insight is normal.    Please note that this dictation was created using voice recognition software. I have made every reasonable attempt to correct obvious errors, but I expect that there are errors of grammar and possibly content that I did not discover before finalizing the note.      Assessment/Plan  1. Anxiety  Well controlled with lorazepam before eye injection appointments, continue.  Tolerating, has a .   reviewed and risks again reviewed with patient.  Advised on meditation.  Advised on counseling.  Patient will consider.  - LORazepam (ATIVAN) 0.5 MG Tab; Take 1 Tablet by mouth every 8 hours as needed for Anxiety for up to 30 days. Indications: Feeling Anxious  Dispense: 15 Tablet; Refill: 0    2. Dyslipidemia  Advised patient to schedule follow up with cardiology as they wanted to see her if her lipids went into high risk.    3. Post-menopausal  Due    - DS-BONE DENSITY STUDY (DEXA); Future    4. Macular degeneration of both eyes, unspecified type  Continue management with ophthalmology.

## 2023-05-23 NOTE — ASSESSMENT & PLAN NOTE
Well-controlled with lorazepam half of 0.5 mg tab prior to traveling to [place] for eye injections.  Has a  on those days.  Denies adverse effects.  Requesting refill today.  Also discussed other options including counseling.  Patient reports counseling may be beneficial except she would have to drive to [place] which causes anxiety.  Discussed virtual appointments, patient will consider.  Also advised on meditation apps/CDs on the way to [place].

## 2023-05-23 NOTE — ASSESSMENT & PLAN NOTE
Continues with injections 2-4 times a month.  We will need to do for the rest of her life.  Continues with anxiety, improved control with lorazepam prior to appointments.  Please see additional notes under anxiety.

## 2023-05-23 NOTE — ASSESSMENT & PLAN NOTE
Cholesterol levels still elevated.  Cardiac CT scoring was 0, so discontinued pravastatin over 6 months ago.  Exercising, but could improve on diet.  Cardiology wanted to see patient if lipids show high risk.  She will schedule follow-up appointment.    Component      Latest Ref Rng 5/16/2023   Cholesterol,Tot      100 - 199 mg/dL 265 (H)    Triglycerides      0 - 149 mg/dL 241 (H)    HDL      >=40 mg/dL 58    LDL      <100 mg/dL 159 (H)

## 2023-08-27 SDOH — HEALTH STABILITY: PHYSICAL HEALTH: ON AVERAGE, HOW MANY MINUTES DO YOU ENGAGE IN EXERCISE AT THIS LEVEL?: 50 MIN

## 2023-08-27 SDOH — ECONOMIC STABILITY: INCOME INSECURITY: IN THE LAST 12 MONTHS, WAS THERE A TIME WHEN YOU WERE NOT ABLE TO PAY THE MORTGAGE OR RENT ON TIME?: NO

## 2023-08-27 SDOH — HEALTH STABILITY: MENTAL HEALTH
STRESS IS WHEN SOMEONE FEELS TENSE, NERVOUS, ANXIOUS, OR CAN'T SLEEP AT NIGHT BECAUSE THEIR MIND IS TROUBLED. HOW STRESSED ARE YOU?: RATHER MUCH

## 2023-08-27 SDOH — ECONOMIC STABILITY: HOUSING INSECURITY
IN THE LAST 12 MONTHS, WAS THERE A TIME WHEN YOU DID NOT HAVE A STEADY PLACE TO SLEEP OR SLEPT IN A SHELTER (INCLUDING NOW)?: NO

## 2023-08-27 SDOH — ECONOMIC STABILITY: FOOD INSECURITY: WITHIN THE PAST 12 MONTHS, THE FOOD YOU BOUGHT JUST DIDN'T LAST AND YOU DIDN'T HAVE MONEY TO GET MORE.: NEVER TRUE

## 2023-08-27 SDOH — ECONOMIC STABILITY: TRANSPORTATION INSECURITY
IN THE PAST 12 MONTHS, HAS LACK OF TRANSPORTATION KEPT YOU FROM MEETINGS, WORK, OR FROM GETTING THINGS NEEDED FOR DAILY LIVING?: NO

## 2023-08-27 SDOH — ECONOMIC STABILITY: TRANSPORTATION INSECURITY
IN THE PAST 12 MONTHS, HAS THE LACK OF TRANSPORTATION KEPT YOU FROM MEDICAL APPOINTMENTS OR FROM GETTING MEDICATIONS?: NO

## 2023-08-27 SDOH — ECONOMIC STABILITY: FOOD INSECURITY: WITHIN THE PAST 12 MONTHS, YOU WORRIED THAT YOUR FOOD WOULD RUN OUT BEFORE YOU GOT MONEY TO BUY MORE.: NEVER TRUE

## 2023-08-27 SDOH — HEALTH STABILITY: PHYSICAL HEALTH: ON AVERAGE, HOW MANY DAYS PER WEEK DO YOU ENGAGE IN MODERATE TO STRENUOUS EXERCISE (LIKE A BRISK WALK)?: 5 DAYS

## 2023-08-27 SDOH — ECONOMIC STABILITY: HOUSING INSECURITY: IN THE LAST 12 MONTHS, HOW MANY PLACES HAVE YOU LIVED?: 1

## 2023-08-27 SDOH — ECONOMIC STABILITY: INCOME INSECURITY: HOW HARD IS IT FOR YOU TO PAY FOR THE VERY BASICS LIKE FOOD, HOUSING, MEDICAL CARE, AND HEATING?: NOT HARD AT ALL

## 2023-08-27 SDOH — ECONOMIC STABILITY: TRANSPORTATION INSECURITY
IN THE PAST 12 MONTHS, HAS LACK OF RELIABLE TRANSPORTATION KEPT YOU FROM MEDICAL APPOINTMENTS, MEETINGS, WORK OR FROM GETTING THINGS NEEDED FOR DAILY LIVING?: NO

## 2023-08-27 ASSESSMENT — LIFESTYLE VARIABLES
HOW OFTEN DO YOU HAVE A DRINK CONTAINING ALCOHOL: MONTHLY OR LESS
SKIP TO QUESTIONS 9-10: 1
HOW MANY STANDARD DRINKS CONTAINING ALCOHOL DO YOU HAVE ON A TYPICAL DAY: 1 OR 2
AUDIT-C TOTAL SCORE: 1
HOW OFTEN DO YOU HAVE SIX OR MORE DRINKS ON ONE OCCASION: NEVER

## 2023-08-27 ASSESSMENT — SOCIAL DETERMINANTS OF HEALTH (SDOH)
HOW OFTEN DO YOU GET TOGETHER WITH FRIENDS OR RELATIVES?: TWICE A WEEK
DO YOU BELONG TO ANY CLUBS OR ORGANIZATIONS SUCH AS CHURCH GROUPS UNIONS, FRATERNAL OR ATHLETIC GROUPS, OR SCHOOL GROUPS?: YES
HOW OFTEN DO YOU GET TOGETHER WITH FRIENDS OR RELATIVES?: TWICE A WEEK
HOW OFTEN DO YOU ATTEND CHURCH OR RELIGIOUS SERVICES?: NEVER
HOW HARD IS IT FOR YOU TO PAY FOR THE VERY BASICS LIKE FOOD, HOUSING, MEDICAL CARE, AND HEATING?: NOT HARD AT ALL
DO YOU BELONG TO ANY CLUBS OR ORGANIZATIONS SUCH AS CHURCH GROUPS UNIONS, FRATERNAL OR ATHLETIC GROUPS, OR SCHOOL GROUPS?: YES
HOW OFTEN DO YOU ATTEND CHURCH OR RELIGIOUS SERVICES?: NEVER
HOW MANY DRINKS CONTAINING ALCOHOL DO YOU HAVE ON A TYPICAL DAY WHEN YOU ARE DRINKING: 1 OR 2
HOW OFTEN DO YOU ATTENT MEETINGS OF THE CLUB OR ORGANIZATION YOU BELONG TO?: 1 TO 4 TIMES PER YEAR
HOW OFTEN DO YOU HAVE SIX OR MORE DRINKS ON ONE OCCASION: NEVER
WITHIN THE PAST 12 MONTHS, YOU WORRIED THAT YOUR FOOD WOULD RUN OUT BEFORE YOU GOT THE MONEY TO BUY MORE: NEVER TRUE
IN A TYPICAL WEEK, HOW MANY TIMES DO YOU TALK ON THE PHONE WITH FAMILY, FRIENDS, OR NEIGHBORS?: THREE TIMES A WEEK
HOW OFTEN DO YOU ATTENT MEETINGS OF THE CLUB OR ORGANIZATION YOU BELONG TO?: 1 TO 4 TIMES PER YEAR
IN A TYPICAL WEEK, HOW MANY TIMES DO YOU TALK ON THE PHONE WITH FAMILY, FRIENDS, OR NEIGHBORS?: THREE TIMES A WEEK
HOW OFTEN DO YOU HAVE A DRINK CONTAINING ALCOHOL: MONTHLY OR LESS

## 2023-08-29 ENCOUNTER — APPOINTMENT (OUTPATIENT)
Dept: MEDICAL GROUP | Facility: CLINIC | Age: 73
End: 2023-08-29
Payer: MEDICARE

## 2023-08-30 ENCOUNTER — OFFICE VISIT (OUTPATIENT)
Dept: MEDICAL GROUP | Facility: CLINIC | Age: 73
End: 2023-08-30
Payer: MEDICARE

## 2023-08-30 VITALS
WEIGHT: 175.93 LBS | RESPIRATION RATE: 16 BRPM | HEART RATE: 70 BPM | TEMPERATURE: 98.1 F | BODY MASS INDEX: 30.04 KG/M2 | SYSTOLIC BLOOD PRESSURE: 142 MMHG | HEIGHT: 64 IN | OXYGEN SATURATION: 96 % | DIASTOLIC BLOOD PRESSURE: 70 MMHG

## 2023-08-30 DIAGNOSIS — E78.5 DYSLIPIDEMIA: ICD-10-CM

## 2023-08-30 DIAGNOSIS — Z13.21 ENCOUNTER FOR VITAMIN DEFICIENCY SCREENING: ICD-10-CM

## 2023-08-30 DIAGNOSIS — F41.9 ANXIETY: ICD-10-CM

## 2023-08-30 DIAGNOSIS — H35.3221 EXUDATIVE AGE-RELATED MACULAR DEGENERATION OF LEFT EYE WITH ACTIVE CHOROIDAL NEOVASCULARIZATION (HCC): ICD-10-CM

## 2023-08-30 DIAGNOSIS — I10 PRIMARY HYPERTENSION: ICD-10-CM

## 2023-08-30 PROCEDURE — 3078F DIAST BP <80 MM HG: CPT | Performed by: PHYSICIAN ASSISTANT

## 2023-08-30 PROCEDURE — 99214 OFFICE O/P EST MOD 30 MIN: CPT | Performed by: PHYSICIAN ASSISTANT

## 2023-08-30 PROCEDURE — 3077F SYST BP >= 140 MM HG: CPT | Performed by: PHYSICIAN ASSISTANT

## 2023-08-30 RX ORDER — HYDROXYZINE HYDROCHLORIDE 25 MG/1
25 TABLET, FILM COATED ORAL 3 TIMES DAILY PRN
Qty: 30 TABLET | Refills: 0 | Status: SHIPPED | OUTPATIENT
Start: 2023-08-30 | End: 2023-11-01

## 2023-08-30 RX ORDER — LORAZEPAM 0.5 MG/1
TABLET ORAL
COMMUNITY
Start: 2023-04-23 | End: 2023-11-01 | Stop reason: SDUPTHER

## 2023-08-30 ASSESSMENT — FIBROSIS 4 INDEX: FIB4 SCORE: 1.36

## 2023-08-30 NOTE — PROGRESS NOTES
cc:  anxiety    Subjective:     Karolina Gilmore is a 73 y.o. female presenting for anxiety      Patient presents to the office for anxiety.  Patient states that she is taking medication for eye injections.  She does take ativan.  She is willing to try hydroxyzine to see if this will help with anxiety with injections.  She states that she took a small amount of the ativan previously.      Patient also has dyslipidemia, hypertension.  She does see cardiology.  She is coming due for labs approximately November and has an appointment with me.  She is willing to have labs drawn before coming in to be seen.    Review of systems:  See above.   Denies any symptoms unless previously indicated.        Current Outpatient Medications:     LORazepam (ATIVAN) 0.5 MG Tab, , Disp: , Rfl:     hydrOXYzine HCl (ATARAX) 25 MG Tab, Take 1 Tablet by mouth 3 times a day as needed for Itching., Disp: 30 Tablet, Rfl: 0    amLODIPine (NORVASC) 10 MG Tab, TAKE ONE TABLET BY MOUTH ONE TIME DAILY, Disp: 90 Tablet, Rfl: 3    hydroCHLOROthiazide (HYDRODIURIL) 25 MG Tab, TAKE ONE TABLET BY MOUTH ONE TIME DAILY, Disp: 90 Tablet, Rfl: 3    lisinopril (PRINIVIL) 40 MG tablet, TAKE ONE TABLET BY MOUTH ONE TIME DAILY, Disp: 90 Tablet, Rfl: 3    NON SPECIFIED, Eyleya eye injections one eye at a time once a month  Indications: avistan eye injections, Disp: , Rfl:     Omega-3 Fatty Acids (FISH OIL) 1000 MG Cap capsule, Take 1,000 mg by mouth 2 times a day., Disp: , Rfl:     Multiple Vitamins-Minerals (ICAPS AREDS 2 PO), , Disp: , Rfl:     estradiol (VAGIFEM) 10 MCG Tab, Insert 10 mcg into the vagina. TWICE Q WEEK., Disp: , Rfl:     Calcium Carbonate-Vitamin D (CALCIUM 500/D PO), Take  by mouth., Disp: , Rfl:     pantoprazole (PROTONIX) 40 MG TBEC, Take 40 mg by mouth every day., Disp: , Rfl:     Probiotic Product (PROBIOTIC DAILY PO), Take  by mouth., Disp: , Rfl:     Multiple Vitamins-Minerals (MULTIVITAMIN PO), Take  by mouth., Disp: , Rfl:      "vitamin D (CHOLECALCIFEROL) 1000 UNIT TABS, Take 1,000 Units by mouth every day., Disp: , Rfl:     Allergies, past medical history, past surgical history, family history, social history reviewed and updated    Objective:     Vitals: BP (!) 142/70 (BP Location: Left arm, Patient Position: Sitting, BP Cuff Size: Adult)   Pulse 70   Temp 36.7 °C (98.1 °F) (Temporal)   Resp 16   Ht 1.626 m (5' 4\")   Wt 79.8 kg (175 lb 14.8 oz)   SpO2 96%   BMI 30.20 kg/m²   General: Alert, pleasant, NAD  EYES:   PERRL, EOMI, no icterus or pallor.  Conjunctivae and lids normal.   HENT:  Normocephalic.  External ears normal. .  Neck supple.  Respiratory: Normal respiratory effort.    Abdomen: obese  Skin: Warm, dry, no rashes.  Musculoskeletal: Gait is normal.  Moves all extremities well.    Extremities: Normal range of motion all extremities.   Neurological: No tremors, sensation grossly intact,  CN2-12 intact.  Psych:  Affect/mood is normal, judgement is good, memory is intact, grooming is appropriate.    Assessment/Plan:     Karolina was seen today for establish care.    Diagnoses and all orders for this visit:    Anxiety  -     hydrOXYzine HCl (ATARAX) 25 MG Tab; Take 1 Tablet by mouth 3 times a day as needed for Itching.  Exudative age-related macular degeneration of left eye with active choroidal neovascularization (HCC)  -     hydrOXYzine HCl (ATARAX) 25 MG Tab; Take 1 Tablet by mouth 3 times a day as needed for Itching.    Patient takes the medication to calm her for injections in her eye for macular degeneration.  Patient is willing to try the hydroxyzine.  However if this is less effective for her, this is a situation where I would be willing to consider hydroxyzine use.  Patient will let me know if hydroxyzine will be effective for her.    Dyslipidemia  -     Lipid Profile; Future  -     Comp Metabolic Panel; Future  -     TSH WITH REFLEX TO FT4; Future  Primary hypertension  -     Lipid Profile; Future  -     Comp " Metabolic Panel; Future  -     TSH WITH REFLEX TO FT4; Future  Encounter for vitamin deficiency screening  -     VITAMIN D,25 HYDROXY (DEFICIENCY); Future      Obtain labs and follow-up in November as planned.      No follow-ups on file.    Please note that this dictation was created using voice recognition software. I have made every reasonable attempt to correct obvious errors, but expect that there are errors of grammar and possible content that I did not discover before finalizing note.

## 2023-09-13 ENCOUNTER — TELEPHONE (OUTPATIENT)
Dept: CARDIOLOGY | Facility: MEDICAL CENTER | Age: 73
End: 2023-09-13
Payer: COMMERCIAL

## 2023-09-13 ENCOUNTER — TELEPHONE (OUTPATIENT)
Dept: MEDICAL GROUP | Facility: CLINIC | Age: 73
End: 2023-09-13
Payer: COMMERCIAL

## 2023-09-13 NOTE — TELEPHONE ENCOUNTER
VOICEMAIL  1. Caller Name: Patria-Occupational Health RN                         Call Back Number: [phone]    2. 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 She states pt had horrible cardiac complications 20 years ago when she took the Flu shot. She states if we are willing to sign this exemption she can fax it over.     3. Patient approves office to leave a detailed voicemail/MyChart message: yes    Phone Number Called: [phone]     Call outcome:  Spoke with Patria at [facility] regarding the message below.    Message: Informed caller that Erika is only able to sign a vaccine exemption if we have documentation of the reaction. Caller states that she will inform the pt and see if they can find the documents, however this was 20 years ago so pt may have to go on leave of absence.

## 2023-09-13 NOTE — TELEPHONE ENCOUNTER
VR    Caller: VICTORINA España    Name and Department:     [facility]  P: [phone]    Topic/Issue: FLU EXEMPTION    Per Patria;    20 years ago patient had a reaction to her flu shot that resulted in an ER visit where she was diagnoses with a cardiac condition. Patria is requesting a call back from the MA to see if VR is aware of this event. Please advise.    Thank you,  Sundeep VINSON    Callback Number or Extension: [phone]

## 2023-09-13 NOTE — TELEPHONE ENCOUNTER
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 In order for them to provide the exemption, they need some type of medical documentation that the reaction occurred. I advised them that the earliest we had seen the patient in office was listed at 2013, and the first OV with you was 2020.     Please advise if this is something you were aware of or could advise for or against the flu shot with her cardiac history? They stated they could give her a leave of absence once the requirement sets in until her OV if needed to further discuss with you as well. Thank you!

## 2023-09-14 NOTE — TELEPHONE ENCOUNTER
Kerwin Brunson M.D.  You 17 hours ago (4:48 PM)     Oh boy. Political land-mine. To my knowledge no reason cannot tolerate any indicated vaccination of any type. Thanks Cynthia!      Phone number called: [phone]    Call outcome: Voicemail reached. Message left with number provided to return call.

## 2023-09-18 NOTE — TELEPHONE ENCOUNTER
Phone number called: [phone]    Call outcome: Voicemail reached. Message left with number provided to return call.

## 2023-09-18 NOTE — TELEPHONE ENCOUNTER
VR    Caller: Patria at [facility]     Topic/issue: Calling Cynthia back     Callback Number: [phone]    Thank You   Roma TIM

## 2023-10-23 DIAGNOSIS — I10 ESSENTIAL HYPERTENSION: ICD-10-CM

## 2023-10-23 RX ORDER — LISINOPRIL 40 MG/1
TABLET ORAL
Qty: 90 TABLET | Refills: 0 | Status: SHIPPED | OUTPATIENT
Start: 2023-10-23 | End: 2024-01-22

## 2023-10-26 ENCOUNTER — HOSPITAL ENCOUNTER (OUTPATIENT)
Dept: LAB | Facility: MEDICAL CENTER | Age: 73
End: 2023-10-26
Attending: PHYSICIAN ASSISTANT
Payer: MEDICARE

## 2023-10-26 DIAGNOSIS — Z13.21 ENCOUNTER FOR VITAMIN DEFICIENCY SCREENING: ICD-10-CM

## 2023-10-26 DIAGNOSIS — E78.5 DYSLIPIDEMIA: ICD-10-CM

## 2023-10-26 DIAGNOSIS — I10 PRIMARY HYPERTENSION: ICD-10-CM

## 2023-10-26 LAB
25(OH)D3 SERPL-MCNC: 53 NG/ML (ref 30–100)
ALBUMIN SERPL BCP-MCNC: 5.1 G/DL (ref 3.2–4.9)
ALBUMIN/GLOB SERPL: 1.9 G/DL
ALP SERPL-CCNC: 82 U/L (ref 30–99)
ALT SERPL-CCNC: 19 U/L (ref 2–50)
ANION GAP SERPL CALC-SCNC: 11 MMOL/L (ref 7–16)
AST SERPL-CCNC: 23 U/L (ref 12–45)
BILIRUB SERPL-MCNC: 0.6 MG/DL (ref 0.1–1.5)
BUN SERPL-MCNC: 18 MG/DL (ref 8–22)
CALCIUM ALBUM COR SERPL-MCNC: 9.3 MG/DL (ref 8.5–10.5)
CALCIUM SERPL-MCNC: 10.2 MG/DL (ref 8.5–10.5)
CHLORIDE SERPL-SCNC: 99 MMOL/L (ref 96–112)
CHOLEST SERPL-MCNC: 285 MG/DL (ref 100–199)
CO2 SERPL-SCNC: 28 MMOL/L (ref 20–33)
CREAT SERPL-MCNC: 0.99 MG/DL (ref 0.5–1.4)
FASTING STATUS PATIENT QL REPORTED: NORMAL
GFR SERPLBLD CREATININE-BSD FMLA CKD-EPI: 60 ML/MIN/1.73 M 2
GLOBULIN SER CALC-MCNC: 2.7 G/DL (ref 1.9–3.5)
GLUCOSE SERPL-MCNC: 104 MG/DL (ref 65–99)
HDLC SERPL-MCNC: 62 MG/DL
LDLC SERPL CALC-MCNC: 181 MG/DL
POTASSIUM SERPL-SCNC: 4.3 MMOL/L (ref 3.6–5.5)
PROT SERPL-MCNC: 7.8 G/DL (ref 6–8.2)
SODIUM SERPL-SCNC: 138 MMOL/L (ref 135–145)
TRIGL SERPL-MCNC: 212 MG/DL (ref 0–149)
TSH SERPL DL<=0.005 MIU/L-ACNC: 2.98 UIU/ML (ref 0.38–5.33)

## 2023-10-26 PROCEDURE — 84443 ASSAY THYROID STIM HORMONE: CPT

## 2023-10-26 PROCEDURE — 80053 COMPREHEN METABOLIC PANEL: CPT

## 2023-10-26 PROCEDURE — 80061 LIPID PANEL: CPT

## 2023-10-26 PROCEDURE — 36415 COLL VENOUS BLD VENIPUNCTURE: CPT | Mod: GA

## 2023-10-26 PROCEDURE — 82306 VITAMIN D 25 HYDROXY: CPT | Mod: GA

## 2023-11-01 ENCOUNTER — OFFICE VISIT (OUTPATIENT)
Dept: MEDICAL GROUP | Facility: CLINIC | Age: 73
End: 2023-11-01
Payer: MEDICARE

## 2023-11-01 VITALS
HEART RATE: 67 BPM | WEIGHT: 178.57 LBS | SYSTOLIC BLOOD PRESSURE: 120 MMHG | RESPIRATION RATE: 16 BRPM | TEMPERATURE: 97.6 F | OXYGEN SATURATION: 98 % | HEIGHT: 64 IN | BODY MASS INDEX: 30.49 KG/M2 | DIASTOLIC BLOOD PRESSURE: 62 MMHG

## 2023-11-01 DIAGNOSIS — F41.9 ANXIETY: ICD-10-CM

## 2023-11-01 DIAGNOSIS — H35.3231 EXUDATIVE AGE-RELATED MACULAR DEGENERATION OF BOTH EYES WITH ACTIVE CHOROIDAL NEOVASCULARIZATION (HCC): ICD-10-CM

## 2023-11-01 DIAGNOSIS — E78.5 DYSLIPIDEMIA: ICD-10-CM

## 2023-11-01 PROCEDURE — 3074F SYST BP LT 130 MM HG: CPT | Performed by: PHYSICIAN ASSISTANT

## 2023-11-01 PROCEDURE — 99214 OFFICE O/P EST MOD 30 MIN: CPT | Performed by: PHYSICIAN ASSISTANT

## 2023-11-01 PROCEDURE — 3078F DIAST BP <80 MM HG: CPT | Performed by: PHYSICIAN ASSISTANT

## 2023-11-01 RX ORDER — LORAZEPAM 0.5 MG/1
TABLET ORAL
Qty: 30 TABLET | Refills: 0 | Status: SHIPPED | OUTPATIENT
Start: 2023-11-01 | End: 2023-11-08 | Stop reason: SDUPTHER

## 2023-11-01 RX ORDER — ROSUVASTATIN CALCIUM 10 MG/1
10 TABLET, COATED ORAL EVERY EVENING
Qty: 90 TABLET | Refills: 1 | Status: SHIPPED | OUTPATIENT
Start: 2023-11-01

## 2023-11-01 ASSESSMENT — FIBROSIS 4 INDEX: FIB4 SCORE: 1.27

## 2023-11-01 NOTE — PROGRESS NOTES
cc:  anxiety    Subjective:     Karolina Gilmore is a 73 y.o. female presenting for anxiety      Patient presents to the office for anxiety.  Should last visit, patient presented to the office indicating that she needed a refill of lorazepam.  She will take this medication usually before having a procedure for her eye.  At the time we wanted to try hydroxyzine to see if this would be effective for her.  However patient indicates that she cannot take this as it can exacerbate a prolonged QT syndrome which she indicates having.  It is not currently listed in her chart.  I do not see any indication of this on her last EKG.  She states that it was first diagnosed with a flu shot.  She states that she did have a thallium stress test as a result and it was also seen with the last 2 colonoscopies and states that they had a hard time bringing her back from anesthesia.  Patient does find that the benefit of the lorazepam is significant enough for her to continue to use the medication before an eye treatment.  She has found this medication to be very helpful.    Patient also indicates as previously mentioned that this first occurred with the flu shot.  She is also needing an exemption for her job for the flu shot.  Patient indicates that when she initially had the flu shot 1995, it did cause a heart arrhythmia as well as a near syncopal episode.  She was seen by cardiology soon after and instructed not to continue with the flu vaccine.  Currently she is needing an exemption from work.    Patient is also here to follow up with her most recent labs.  Her labs do show that her cholesterol is elevated.  When she was last seen by cardiology, her cholesterol was doing well and she was taken off her medication.  However since being taken off medication, she has seen her cholesterol increase.  She does have an upcoming appointment with cardiology on the 17th of this month.    Review of systems:  See above.   Denies any symptoms  "unless previously indicated.        Current Outpatient Medications:     LORazepam (ATIVAN) 0.5 MG Tab, Take 1/2 to 1 tab before eye procedure for anxiety  Indications: Feeling Anxious, Disp: 30 Tablet, Rfl: 0    rosuvastatin (CRESTOR) 10 MG Tab, Take 1 Tablet by mouth every evening., Disp: 90 Tablet, Rfl: 1    lisinopril (PRINIVIL) 40 MG tablet, TAKE ONE TABLET BY MOUTH ONE TIME DAILY, Disp: 90 Tablet, Rfl: 0    amLODIPine (NORVASC) 10 MG Tab, TAKE ONE TABLET BY MOUTH ONE TIME DAILY, Disp: 90 Tablet, Rfl: 3    hydroCHLOROthiazide (HYDRODIURIL) 25 MG Tab, TAKE ONE TABLET BY MOUTH ONE TIME DAILY, Disp: 90 Tablet, Rfl: 3    NON SPECIFIED, Eyleya eye injections one eye at a time once a month  Indications: avistan eye injections, Disp: , Rfl:     Omega-3 Fatty Acids (FISH OIL) 1000 MG Cap capsule, Take 1,000 mg by mouth 2 times a day., Disp: , Rfl:     Multiple Vitamins-Minerals (ICAPS AREDS 2 PO), , Disp: , Rfl:     estradiol (VAGIFEM) 10 MCG Tab, Insert 10 mcg into the vagina. TWICE Q WEEK., Disp: , Rfl:     Calcium Carbonate-Vitamin D (CALCIUM 500/D PO), Take  by mouth., Disp: , Rfl:     pantoprazole (PROTONIX) 40 MG TBEC, Take 40 mg by mouth every day., Disp: , Rfl:     Probiotic Product (PROBIOTIC DAILY PO), Take  by mouth., Disp: , Rfl:     Multiple Vitamins-Minerals (MULTIVITAMIN PO), Take  by mouth., Disp: , Rfl:     vitamin D (CHOLECALCIFEROL) 1000 UNIT TABS, Take 1,000 Units by mouth every day., Disp: , Rfl:     Allergies, past medical history, past surgical history, family history, social history reviewed and updated    Objective:     Vitals: /62 (BP Location: Left arm, Patient Position: Sitting, BP Cuff Size: Adult)   Pulse 67   Temp 36.4 °C (97.6 °F) (Temporal)   Resp 16   Ht 1.626 m (5' 4\")   Wt 81 kg (178 lb 9.2 oz)   SpO2 98%   BMI 30.65 kg/m²   General: Alert, pleasant, NAD  EYES:   PERRL, EOMI, no icterus or pallor.  Conjunctivae and lids normal.   HENT:  Normocephalic.  External ears " normal.   Neck supple.     Respiratory: Normal respiratory effort.   Abdomen: obese  Skin: Warm, dry, no rashes.  Musculoskeletal: Gait is normal.  Moves all extremities well.    Extremities: normal range of motion all extremities..   Neurological: No tremors, sensation grossly intact, gait is normal, CN2-12 intact.  Psych:  Affect/mood is normal, judgement is good, memory is intact, grooming is appropriate.     Latest Reference Range & Units 10/26/23 08:36   Sodium 135 - 145 mmol/L 138   Potassium 3.6 - 5.5 mmol/L 4.3   Chloride 96 - 112 mmol/L 99   Co2 20 - 33 mmol/L 28   Anion Gap 7.0 - 16.0  11.0   Glucose 65 - 99 mg/dL 104 (H)   Bun 8 - 22 mg/dL 18   Creatinine 0.50 - 1.40 mg/dL 0.99   GFR (CKD-EPI) >60 mL/min/1.73 m 2 60   Calcium 8.5 - 10.5 mg/dL 10.2   Correct Calcium 8.5 - 10.5 mg/dL 9.3   AST(SGOT) 12 - 45 U/L 23   ALT(SGPT) 2 - 50 U/L 19   Alkaline Phosphatase 30 - 99 U/L 82   Total Bilirubin 0.1 - 1.5 mg/dL 0.6   Albumin 3.2 - 4.9 g/dL 5.1 (H)   Total Protein 6.0 - 8.2 g/dL 7.8   Globulin 1.9 - 3.5 g/dL 2.7   A-G Ratio g/dL 1.9   Fasting Status  Fasting   Cholesterol,Tot 100 - 199 mg/dL 285 (H)   Triglycerides 0 - 149 mg/dL 212 (H)   HDL >=40 mg/dL 62   LDL <100 mg/dL 181 (H)   25-Hydroxy   Vitamin D 25 30 - 100 ng/mL 53   TSH 0.380 - 5.330 uIU/mL 2.980   (H): Data is abnormally high    Assessment/Plan:     Karolina was seen today for anxiety and lab results.    Diagnoses and all orders for this visit:    Exudative age-related macular degeneration of both eyes with active choroidal neovascularization (HCC)  -     Controlled Substance Treatment Agreement  -     LORazepam (ATIVAN) 0.5 MG Tab; Take 1/2 to 1 tab before eye procedure for anxiety  Indications: Feeling Anxious  Anxiety  -     Controlled Substance Treatment Agreement  -     LORazepam (ATIVAN) 0.5 MG Tab; Take 1/2 to 1 tab before eye procedure for anxiety  Indications: Feeling Anxious    I have discussed with patient the addictive nature of this  medication.  She is using only 2 calm her anxiety before having an eye procedure which occurs twice in a month.  A Risk of Abuse assessment for opioid abuse was previously preformed and documented in the past medical history. The treatment plan was reviewed and discussed with the patient. The pharmacy monitoring report was requested and reviewed. The treatment plan was adjusted based on efficacy as described.     Opioid Risk Score: 0    Interpretation of Opioid Risk Score   Score 0-3 = Low risk of abuse. Do UDS at least once per year.  Score 4-7 = Moderate risk of abuse. Do UDS 1-4 times per year.  Score 8+ = High risk of abuse. Refer to specialist.     Dyslipidemia  -     rosuvastatin (CRESTOR) 10 MG Tab; Take 1 Tablet by mouth every evening.    We will prescribe cholesterol medication at this time including Crestor 10 mg daily.  Patient will wait until she discusses this further with cardiology.    Patient does have a concern taking the flu vaccine.  She did have a significant event with her first dose in 1995/96.  We will have supervising physician review to determine if exemption qualification is meant.        No follow-ups on file.    Please note that this dictation was created using voice recognition software. I have made every reasonable attempt to correct obvious errors, but expect that there are errors of grammar and possible content that I did not discover before finalizing note.

## 2023-11-02 ENCOUNTER — PATIENT MESSAGE (OUTPATIENT)
Dept: MEDICAL GROUP | Facility: CLINIC | Age: 73
End: 2023-11-02
Payer: COMMERCIAL

## 2023-11-02 NOTE — LETTER
November 2, 2023       Patient: Karolina Gilmore   YOB: 1950   Date of Visit: 11/2/2023         To Whom It May Concern:    Kendall experienced cardiac complications when she last received the flu vaccine.    If you have any questions or concerns, please don't hesitate to call [phone]          Sincerely,          Erika Arriaza P.A.-C.  Electronically Signed

## 2023-11-03 NOTE — PROGRESS NOTES
Letter regarding flu vaccine  
We need to send the printed letter to Alejo at [facility]  
Montez(Fellow)

## 2023-11-06 ENCOUNTER — TELEPHONE (OUTPATIENT)
Dept: MEDICAL GROUP | Facility: CLINIC | Age: 73
End: 2023-11-06
Payer: COMMERCIAL

## 2023-11-06 NOTE — TELEPHONE ENCOUNTER
Pt has questions about lactation.  Please call her.  She did want appt with Gaby Whitley, she is not available.      Favian Mendoza on 12/29/2020 at 10:08 AM     VOICEMAIL  1. [facility]                        Call Back Number: [phone]    2. Message: [facility] pharmacy states they received rx for Lorazepam 0.5mg. He states this medication is on back order and they will not have it for a while. He states they can fill Alprazolam, Clonazepam, or Diazepam. Please advise.     3. Patient approves office to leave a detailed voicemail/MyChart message: yes

## 2023-11-08 DIAGNOSIS — F41.9 ANXIETY: ICD-10-CM

## 2023-11-08 DIAGNOSIS — H35.3231 EXUDATIVE AGE-RELATED MACULAR DEGENERATION OF BOTH EYES WITH ACTIVE CHOROIDAL NEOVASCULARIZATION (HCC): ICD-10-CM

## 2023-11-08 RX ORDER — LORAZEPAM 0.5 MG/1
TABLET ORAL
Qty: 30 TABLET | Refills: 0 | Status: SHIPPED | OUTPATIENT
Start: 2023-11-08 | End: 2024-05-08

## 2023-11-12 DIAGNOSIS — Z79.899 LONG TERM CURRENT USE OF DIURETIC: ICD-10-CM

## 2023-11-12 DIAGNOSIS — I10 ESSENTIAL HYPERTENSION: ICD-10-CM

## 2023-11-13 RX ORDER — HYDROCHLOROTHIAZIDE 25 MG/1
TABLET ORAL
Qty: 90 TABLET | Refills: 0 | Status: SHIPPED | OUTPATIENT
Start: 2023-11-13 | End: 2024-02-08

## 2023-11-14 ENCOUNTER — HOSPITAL ENCOUNTER (OUTPATIENT)
Dept: LAB | Facility: MEDICAL CENTER | Age: 73
End: 2023-11-14
Attending: INTERNAL MEDICINE
Payer: MEDICARE

## 2023-11-14 DIAGNOSIS — Z79.899 LONG TERM CURRENT USE OF DIURETIC: ICD-10-CM

## 2023-11-14 LAB — MAGNESIUM SERPL-MCNC: 2.3 MG/DL (ref 1.5–2.5)

## 2023-11-14 PROCEDURE — 36415 COLL VENOUS BLD VENIPUNCTURE: CPT

## 2023-11-14 PROCEDURE — 83735 ASSAY OF MAGNESIUM: CPT

## 2023-11-17 ENCOUNTER — OFFICE VISIT (OUTPATIENT)
Dept: CARDIOLOGY | Facility: PHYSICIAN GROUP | Age: 73
End: 2023-11-17
Payer: MEDICARE

## 2023-11-17 VITALS
RESPIRATION RATE: 12 BRPM | OXYGEN SATURATION: 96 % | WEIGHT: 176.4 LBS | BODY MASS INDEX: 30.11 KG/M2 | HEIGHT: 64 IN | HEART RATE: 67 BPM | DIASTOLIC BLOOD PRESSURE: 62 MMHG | SYSTOLIC BLOOD PRESSURE: 124 MMHG

## 2023-11-17 DIAGNOSIS — I31.39 PERICARDIAL EFFUSION: ICD-10-CM

## 2023-11-17 DIAGNOSIS — Z91.89 10 YEAR RISK OF MI OR STROKE 7.5% OR GREATER: ICD-10-CM

## 2023-11-17 DIAGNOSIS — E78.5 HYPERLIPIDEMIA, UNSPECIFIED HYPERLIPIDEMIA TYPE: ICD-10-CM

## 2023-11-17 DIAGNOSIS — I10 PRIMARY HYPERTENSION: ICD-10-CM

## 2023-11-17 DIAGNOSIS — R93.1 AGATSTON CAC SCORE, <100: ICD-10-CM

## 2023-11-17 DIAGNOSIS — E78.5 DYSLIPIDEMIA: ICD-10-CM

## 2023-11-17 PROCEDURE — 3074F SYST BP LT 130 MM HG: CPT | Performed by: INTERNAL MEDICINE

## 2023-11-17 PROCEDURE — 99214 OFFICE O/P EST MOD 30 MIN: CPT | Performed by: INTERNAL MEDICINE

## 2023-11-17 PROCEDURE — 3078F DIAST BP <80 MM HG: CPT | Performed by: INTERNAL MEDICINE

## 2023-11-17 ASSESSMENT — ENCOUNTER SYMPTOMS
DYSPNEA ON EXERTION: 0
PND: 0
FLANK PAIN: 0
DIARRHEA: 0
ABDOMINAL PAIN: 0
WEIGHT GAIN: 0
SHORTNESS OF BREATH: 0
CLAUDICATION: 0
WEIGHT LOSS: 0
NAUSEA: 0
PALPITATIONS: 0
FEVER: 0
HEARTBURN: 0
COUGH: 0
DECREASED APPETITE: 0
BLURRED VISION: 0
NEAR-SYNCOPE: 0
SYNCOPE: 0
ALTERED MENTAL STATUS: 0
VOMITING: 0
ORTHOPNEA: 0
IRREGULAR HEARTBEAT: 0
CONSTIPATION: 0
BACK PAIN: 0
DEPRESSION: 0
DIZZINESS: 0

## 2023-11-17 ASSESSMENT — FIBROSIS 4 INDEX: FIB4 SCORE: 1.27

## 2023-11-17 NOTE — PROGRESS NOTES
Cardiology Note    Chief Complaint   Patient presents with    Dyslipidemia     FV Dx: Dyslipidemia    Hypertension     FV Dx: Essential hypertension     History of Present Illness: Karolina Gilmore is a 73 y.o. female PMH HTN, HLD, pericardial effusion who presents for follow up.    Doing well. No active cardiac complaints. She remains active with stationary bicycle and aquarobics. Compliant with medications and denies adverse effects. Admits her diet is poor and often consumes fatty foods or red meat.     Review of Systems   Constitutional: Negative for decreased appetite, fever, malaise/fatigue, weight gain and weight loss.   HENT:  Negative for congestion and nosebleeds.    Eyes:  Negative for blurred vision.   Cardiovascular:  Negative for chest pain, claudication, dyspnea on exertion, irregular heartbeat, leg swelling, near-syncope, orthopnea, palpitations, paroxysmal nocturnal dyspnea and syncope.   Respiratory:  Negative for cough and shortness of breath.    Endocrine: Negative for cold intolerance and heat intolerance.   Skin:  Negative for rash.   Musculoskeletal:  Negative for back pain.   Gastrointestinal:  Negative for abdominal pain, constipation, diarrhea, heartburn, melena, nausea and vomiting.   Genitourinary:  Negative for dysuria, flank pain and hematuria.   Neurological:  Negative for dizziness.   Psychiatric/Behavioral:  Negative for altered mental status and depression.          Past Medical History:   Diagnosis Date    Cerumen debris on tympanic membrane of right ear 5/24/2016    GERD (gastroesophageal reflux disease)     WEBBER (nonalcoholic steatohepatitis)     Vertigo 5/24/2017         Past Surgical History:   Procedure Laterality Date    HYSTERECTOMY, TOTAL ABDOMINAL  96    CARPAL TUNNEL RELEASE      right         Current Outpatient Medications   Medication Sig Dispense Refill    hydroCHLOROthiazide 25 MG Tab TAKE ONE TABLET BY MOUTH ONE TIME DAILY 90 Tablet 0    LORazepam (ATIVAN) 0.5 MG  Tab Take 1/2 to 1 tab before eye procedure for anxiety  Indications: Feeling Anxious 30 Tablet 0    rosuvastatin (CRESTOR) 10 MG Tab Take 1 Tablet by mouth every evening. 90 Tablet 1    lisinopril (PRINIVIL) 40 MG tablet TAKE ONE TABLET BY MOUTH ONE TIME DAILY 90 Tablet 0    amLODIPine (NORVASC) 10 MG Tab TAKE ONE TABLET BY MOUTH ONE TIME DAILY 90 Tablet 3    NON SPECIFIED Eyleya eye injections one eye at a time once a month  Indications: avistan eye injections      Omega-3 Fatty Acids (FISH OIL) 1000 MG Cap capsule Take 1,000 mg by mouth 2 times a day.      Multiple Vitamins-Minerals (ICAPS AREDS 2 PO)       estradiol (VAGIFEM) 10 MCG Tab Insert 10 mcg into the vagina. TWICE Q WEEK.      Calcium Carbonate-Vitamin D (CALCIUM 500/D PO) Take  by mouth.      pantoprazole (PROTONIX) 40 MG TBEC Take 40 mg by mouth every day.      Probiotic Product (PROBIOTIC DAILY PO) Take  by mouth.      Multiple Vitamins-Minerals (MULTIVITAMIN PO) Take  by mouth.      vitamin D (CHOLECALCIFEROL) 1000 UNIT TABS Take 1,000 Units by mouth every day.       No current facility-administered medications for this visit.         Allergies   Allergen Reactions    Amoxicillin-Pot Clavulanate Rash    Augmentin     Ciprofloxacin Rash    Flu Virus Vaccine      Near syncope and cardiac arrythmia    Neomycin Rash         Family History   Problem Relation Age of Onset    Heart Attack Mother 76    Stroke Mother     Other Father 52        Brain tumour          Social History     Socioeconomic History    Marital status:      Spouse name: Not on file    Number of children: Not on file    Years of education: Not on file    Highest education level: Bachelor's degree (e.g., BA, AB, BS)   Occupational History    Not on file   Tobacco Use    Smoking status: Never    Smokeless tobacco: Never   Vaping Use    Vaping Use: Never used   Substance and Sexual Activity    Alcohol use: Not Currently    Drug use: No    Sexual activity: Never   Other Topics Concern  "   Not on file   Social History Narrative    Not on file     Social Determinants of Health     Financial Resource Strain: Low Risk  (8/27/2023)    Overall Financial Resource Strain (CARDIA)     Difficulty of Paying Living Expenses: Not hard at all   Food Insecurity: No Food Insecurity (8/27/2023)    Hunger Vital Sign     Worried About Running Out of Food in the Last Year: Never true     Ran Out of Food in the Last Year: Never true   Transportation Needs: No Transportation Needs (8/27/2023)    PRAPARE - Transportation     Lack of Transportation (Medical): No     Lack of Transportation (Non-Medical): No   Physical Activity: Sufficiently Active (8/27/2023)    Exercise Vital Sign     Days of Exercise per Week: 5 days     Minutes of Exercise per Session: 50 min   Stress: Stress Concern Present (8/27/2023)    [employer] [employer] - Occupational Stress Questionnaire     Feeling of Stress : Rather much   Social Connections: Moderately Isolated (8/27/2023)    Social Connection and Isolation Panel [NHANES]     Frequency of Communication with Friends and Family: Three times a week     Frequency of Social Gatherings with Friends and Family: Twice a week     Attends Yarsani Services: Never     Active Member of Clubs or Organizations: Yes     Attends Club or Organization Meetings: 1 to 4 times per year     Marital Status:    Intimate Partner Violence: Not on file   Housing Stability: Low Risk  (8/27/2023)    Housing Stability Vital Sign     Unable to Pay for Housing in the Last Year: No     Number of Places Lived in the Last Year: 1     Unstable Housing in the Last Year: No         Physical Exam:  Ambulatory Vitals  /62 (BP Location: Left arm, Patient Position: Sitting, BP Cuff Size: Adult)   Pulse 67   Resp 12   Ht 1.626 m (5' 4\")   Wt 80 kg (176 lb 6.4 oz)   SpO2 96%    BP Readings from Last 4 Encounters:   11/17/23 124/62   11/01/23 120/62   08/30/23 (!) 142/70   05/23/23 120/68 " "    Weight/BMI:   Vitals:    11/17/23 1211   BP: 124/62   Weight: 80 kg (176 lb 6.4 oz)   Height: 1.626 m (5' 4\")    Body mass index is 30.28 kg/m².  Wt Readings from Last 4 Encounters:   11/17/23 80 kg (176 lb 6.4 oz)   11/01/23 81 kg (178 lb 9.2 oz)   08/30/23 79.8 kg (175 lb 14.8 oz)   05/23/23 77.9 kg (171 lb 12.8 oz)       Physical Exam  Constitutional:       General: She is not in acute distress.  HENT:      Head: Normocephalic and atraumatic.   Eyes:      Conjunctiva/sclera: Conjunctivae normal.      Pupils: Pupils are equal, round, and reactive to light.   Neck:      Vascular: No JVD.   Cardiovascular:      Rate and Rhythm: Normal rate and regular rhythm.      Heart sounds: Normal heart sounds. No murmur heard.     No friction rub. No gallop.   Pulmonary:      Effort: Pulmonary effort is normal. No respiratory distress.      Breath sounds: Normal breath sounds. No wheezing or rales.   Chest:      Chest wall: No tenderness.   Abdominal:      General: Bowel sounds are normal. There is no distension.      Palpations: Abdomen is soft.   Musculoskeletal:      Cervical back: Normal range of motion and neck supple.   Skin:     General: Skin is warm and dry.   Neurological:      Mental Status: She is alert and oriented to person, place, and time.   Psychiatric:         Mood and Affect: Affect normal.         Judgment: Judgment normal.         Lab Data Review:  Lab Results   Component Value Date/Time    CHOLSTRLTOT 285 (H) 10/26/2023 08:36 AM     (H) 10/26/2023 08:36 AM    HDL 62 10/26/2023 08:36 AM    TRIGLYCERIDE 212 (H) 10/26/2023 08:36 AM       Lab Results   Component Value Date/Time    SODIUM 138 10/26/2023 08:36 AM    POTASSIUM 4.3 10/26/2023 08:36 AM    CHLORIDE 99 10/26/2023 08:36 AM    CO2 28 10/26/2023 08:36 AM    GLUCOSE 104 (H) 10/26/2023 08:36 AM    BUN 18 10/26/2023 08:36 AM    CREATININE 0.99 10/26/2023 08:36 AM     CrCl cannot be calculated (Patient's most recent lab result is older than the " "maximum 7 days allowed.).  Lab Results   Component Value Date/Time    ALKPHOSPHAT 82 10/26/2023 08:36 AM    ASTSGOT 23 10/26/2023 08:36 AM    ALTSGPT 19 10/26/2023 08:36 AM    TBILIRUBIN 0.6 10/26/2023 08:36 AM      Lab Results   Component Value Date/Time    WBC 6.5 01/24/2023 09:11 AM     Lab Results   Component Value Date/Time    HBA1C 5.5 11/06/2013 07:11 AM     No components found for: \"TROP\"      Cardiac Imaging and Procedures Review:      EKG 1/22/20 - sinus, first deg AVB, normal qtc    CAC CT 8/2021  Coronary calcification:  LMA - 0.0  LCX - 0.0  LAD - 0.0  RCA - 0.0  PDA - 0.0  Total Calcium Score: 0.0    TTE 2/11/2020  CONCLUSIONS  Compared to the report of the prior study done 10/16/18 -there is been   no significant change.  Normal left ventricular chamber size.  Left ventricular ejection fraction is visually estimated to be 65%.  Indeterminate diastolic function.  No significant valve abnormalities.   Small pericardial effusion without evidence of hemodynamic compromise.  Left Ventricle  Normal left ventricular chamber size. Normal left ventricular wall   thickness. Normal left ventricular systolic function. Left ventricular   ejection fraction is visually estimated to be 65%. Normal regional wall   motion. Indeterminate diastolic function.  Pericardium  Small pericardial effusion without evidence of hemodynamic compromise.    TTE 10/2018  CONCLUSIONS  Normal left ventricular systolic function.  Left ventricular ejection fraction is visually estimated to be 65%.  Mild mitral regurgitation.  Small pericardial effusion without evidence of hemodynamic compromise.  No Doppler evidence of patent foramen ovale.    Medical Decision Making:  Problem List Items Addressed This Visit       Hypertension    Pericardial effusion    Relevant Orders    EC-ECHOCARDIOGRAM LTD W/O CONT    Hyperlipidemia    Relevant Orders    CT-CARDIAC SCORING    10 year risk of MI or stroke 7.5% or greater (intermediate)    Agatston CAC " score, <100     Pericardial effusion - stable. If repeat limited echo remains so no further testing.     HTN - controlled. Goal <130/80. Continue HCTZ, lisinopril and amlodipine. Monitor at home.    HLD / 10 year risk ascvd intermediate / CAC CT agatston 0 - low risk setting zero calcium. Can repeat testing. Please improve diet printed information.     It was my pleasure to meet with Ms. Gilmore.

## 2023-11-21 DIAGNOSIS — I10 ESSENTIAL HYPERTENSION: ICD-10-CM

## 2023-11-21 RX ORDER — AMLODIPINE BESYLATE 10 MG/1
TABLET ORAL
Qty: 90 TABLET | Refills: 3 | Status: SHIPPED | OUTPATIENT
Start: 2023-11-21

## 2023-11-21 NOTE — TELEPHONE ENCOUNTER
Is the patient due for a refill? Yes    Was the patient seen the past year? Yes    Date of last office visit: 11/17/2023    Does the patient have an upcoming appointment?  No       Provider to refill:VR    Does the patients insurance require a 100 day supply?  No

## 2023-12-04 DIAGNOSIS — I31.39 PERICARDIAL EFFUSION: ICD-10-CM

## 2023-12-15 ENCOUNTER — HOSPITAL ENCOUNTER (OUTPATIENT)
Dept: RADIOLOGY | Facility: MEDICAL CENTER | Age: 73
End: 2023-12-15
Attending: NURSE PRACTITIONER
Payer: MEDICARE

## 2023-12-15 DIAGNOSIS — Z78.0 POST-MENOPAUSAL: ICD-10-CM

## 2023-12-15 PROCEDURE — 77080 DXA BONE DENSITY AXIAL: CPT

## 2024-01-19 DIAGNOSIS — I10 ESSENTIAL HYPERTENSION: ICD-10-CM

## 2024-01-22 RX ORDER — LISINOPRIL 40 MG/1
TABLET ORAL
Qty: 90 TABLET | Refills: 2 | Status: SHIPPED | OUTPATIENT
Start: 2024-01-22

## 2024-01-22 NOTE — TELEPHONE ENCOUNTER
Is the patient due for a refill? Yes    Was the patient seen the past year? Yes    Date of last office visit: 11/17/2023    Does the patient have an upcoming appointment?  No   If yes, When?     Provider to refill:VR    Does the patients insurance require a 100 day supply?  No

## 2024-02-08 DIAGNOSIS — I10 ESSENTIAL HYPERTENSION: ICD-10-CM

## 2024-02-08 RX ORDER — HYDROCHLOROTHIAZIDE 25 MG/1
TABLET ORAL
Qty: 90 TABLET | Refills: 3 | Status: SHIPPED | OUTPATIENT
Start: 2024-02-08

## 2024-03-19 ENCOUNTER — HOSPITAL ENCOUNTER (OUTPATIENT)
Dept: LAB | Facility: MEDICAL CENTER | Age: 74
End: 2024-03-19
Attending: NURSE PRACTITIONER
Payer: MEDICARE

## 2024-03-19 LAB
FOLATE SERPL-MCNC: 36.3 NG/ML
MAGNESIUM SERPL-MCNC: 2.1 MG/DL (ref 1.5–2.5)
VIT B12 SERPL-MCNC: 406 PG/ML (ref 211–911)

## 2024-03-19 PROCEDURE — 82607 VITAMIN B-12: CPT

## 2024-03-19 PROCEDURE — 84597 ASSAY OF VITAMIN K: CPT

## 2024-03-19 PROCEDURE — 84590 ASSAY OF VITAMIN A: CPT

## 2024-03-19 PROCEDURE — 83735 ASSAY OF MAGNESIUM: CPT

## 2024-03-19 PROCEDURE — 82746 ASSAY OF FOLIC ACID SERUM: CPT

## 2024-03-19 PROCEDURE — 36415 COLL VENOUS BLD VENIPUNCTURE: CPT

## 2024-03-19 PROCEDURE — 84630 ASSAY OF ZINC: CPT

## 2024-03-22 LAB — ZINC SERPL-MCNC: 102.7 UG/DL (ref 60–120)

## 2024-03-23 LAB
ANNOTATION COMMENT IMP: NORMAL
PHYTONADIONE SERPL-MCNC: 1.67 NMOL/L (ref 0.22–4.88)
RETINYL PALMITATE SERPL-MCNC: 0.04 MG/L (ref 0–0.1)
VIT A SERPL-MCNC: 0.77 MG/L (ref 0.3–1.2)

## 2024-03-26 ENCOUNTER — HOSPITAL ENCOUNTER (OUTPATIENT)
Dept: RADIOLOGY | Facility: MEDICAL CENTER | Age: 74
End: 2024-03-26
Attending: PHYSICIAN ASSISTANT
Payer: MEDICARE

## 2024-03-26 DIAGNOSIS — Z12.31 VISIT FOR SCREENING MAMMOGRAM: ICD-10-CM

## 2024-03-26 PROCEDURE — 77067 SCR MAMMO BI INCL CAD: CPT

## 2024-04-25 ENCOUNTER — OFFICE VISIT (OUTPATIENT)
Dept: MEDICAL GROUP | Facility: CLINIC | Age: 74
End: 2024-04-25
Payer: MEDICARE

## 2024-04-25 VITALS
OXYGEN SATURATION: 96 % | HEIGHT: 64 IN | RESPIRATION RATE: 18 BRPM | BODY MASS INDEX: 31.05 KG/M2 | WEIGHT: 181.88 LBS | DIASTOLIC BLOOD PRESSURE: 62 MMHG | TEMPERATURE: 97 F | HEART RATE: 69 BPM | SYSTOLIC BLOOD PRESSURE: 124 MMHG

## 2024-04-25 DIAGNOSIS — E78.2 MIXED HYPERLIPIDEMIA: ICD-10-CM

## 2024-04-25 DIAGNOSIS — F41.9 ANXIETY: ICD-10-CM

## 2024-04-25 DIAGNOSIS — H35.3231 EXUDATIVE AGE-RELATED MACULAR DEGENERATION OF BOTH EYES WITH ACTIVE CHOROIDAL NEOVASCULARIZATION (HCC): ICD-10-CM

## 2024-04-25 PROCEDURE — 3078F DIAST BP <80 MM HG: CPT | Performed by: PHYSICIAN ASSISTANT

## 2024-04-25 PROCEDURE — 3074F SYST BP LT 130 MM HG: CPT | Performed by: PHYSICIAN ASSISTANT

## 2024-04-25 PROCEDURE — 99214 OFFICE O/P EST MOD 30 MIN: CPT | Performed by: PHYSICIAN ASSISTANT

## 2024-04-25 RX ORDER — LORAZEPAM 0.5 MG/1
TABLET ORAL
Qty: 30 TABLET | Refills: 0 | Status: SHIPPED | OUTPATIENT
Start: 2024-04-25 | End: 2024-10-24

## 2024-04-25 RX ORDER — PANTOPRAZOLE SODIUM 20 MG/1
20 TABLET, DELAYED RELEASE ORAL DAILY
COMMUNITY
Start: 2024-03-21

## 2024-04-25 ASSESSMENT — FIBROSIS 4 INDEX: FIB4 SCORE: 1.29

## 2024-04-25 ASSESSMENT — PATIENT HEALTH QUESTIONNAIRE - PHQ9: CLINICAL INTERPRETATION OF PHQ2 SCORE: 0

## 2024-04-25 NOTE — PROGRESS NOTES
cc:  macular degeneration    Subjective:     Karolina Gilmore is a 74 y.o. female presenting for macular degeneration      Patient presents to the office for macular degeneration.  Patient is needing a refill of the lorazepam which she takes before she sees the eye doctor to have an injection into the eye.  She is seen for 2 weeks every month.       Patient was taken off of statin by cardiology due to ct cardiac score of ).  Patient will schedule repeat testing.  Will defer to cardiology regarding cholesterol medication .     Review of systems:  See above.   Denies any symptoms unless previously indicated.        Current Outpatient Medications:     pantoprazole (PROTONIX) 20 MG tablet, Take 20 mg by mouth every day., Disp: , Rfl:     LORazepam (ATIVAN) 0.5 MG Tab, Take 1/2 to 1 tab before eye procedure for anxiety  Indications: Feeling Anxious, Disp: 30 Tablet, Rfl: 0    hydroCHLOROthiazide 25 MG Tab, TAKE ONE TABLET BY MOUTH ONE TIME DAILY, Disp: 90 Tablet, Rfl: 3    lisinopril (PRINIVIL) 40 MG tablet, TAKE ONE TABLET BY MOUTH ONE TIME DAILY, Disp: 90 Tablet, Rfl: 2    amLODIPine (NORVASC) 10 MG Tab, TAKE ONE TABLET BY MOUTH ONE TIME DAILY, Disp: 90 Tablet, Rfl: 3    NON SPECIFIED, Eyleya eye injections one eye at a time once a month  Indications: avistan eye injections, Disp: , Rfl:     Omega-3 Fatty Acids (FISH OIL) 1000 MG Cap capsule, Take 1,000 mg by mouth 2 times a day., Disp: , Rfl:     Multiple Vitamins-Minerals (ICAPS AREDS 2 PO), , Disp: , Rfl:     estradiol (VAGIFEM) 10 MCG Tab, Insert 10 mcg into the vagina. TWICE Q WEEK., Disp: , Rfl:     Calcium Carbonate-Vitamin D (CALCIUM 500/D PO), Take  by mouth., Disp: , Rfl:     Probiotic Product (PROBIOTIC DAILY PO), Take  by mouth., Disp: , Rfl:     Multiple Vitamins-Minerals (MULTIVITAMIN PO), Take  by mouth., Disp: , Rfl:     vitamin D (CHOLECALCIFEROL) 1000 UNIT TABS, Take 1,000 Units by mouth every day., Disp: , Rfl:     Allergies, past medical history,  "past surgical history, family history, social history reviewed and updated    Objective:     Vitals: /62 (BP Location: Left arm, Patient Position: Sitting, BP Cuff Size: Large adult)   Pulse 69   Temp 36.1 °C (97 °F) (Temporal)   Resp 18   Ht 1.626 m (5' 4\")   Wt 82.5 kg (181 lb 14.1 oz)   SpO2 96%   BMI 31.22 kg/m²   General: Alert, pleasant, NAD  EYES:   PERRL, EOMI, no icterus or pallor.  Conjunctivae and lids normal.   HENT:  Normocephalic.  External ears normal.   Neck supple.     Respiratory: Normal respiratory effort.   Abdomen: obese  Skin: Warm, dry, no rashes.  Musculoskeletal: Gait is normal.  Moves all extremities well.    Extremities: normal range of motion all extremities.   Neurological: No tremors, sensation grossly intact,  CN2-12 intact.  Psych:  Affect/mood is normal, judgement is good, memory is intact, grooming is appropriate.     Latest Reference Range & Units 03/19/24 13:05   Magnesium 1.5 - 2.5 mg/dL 2.1   Zinc Serum 60.0 - 120.0 ug/dL 102.7   Folate -Folic Acid >4.0 ng/mL 36.3   Retinal Interp  Normal   Retinol Palm 0.00 - 0.10 mg/L 0.04   Vitamin A 0.30 - 1.20 mg/L 0.77   Vitamin B12 -True Cobalamin 211 - 911 pg/mL 406   Vitamin K 0.22 - 4.88 nmol/L 1.67       Assessment/Plan:     Karolina was seen today for follow-up.    Diagnoses and all orders for this visit:    Exudative age-related macular degeneration of both eyes with active choroidal neovascularization (HCC)  -     LORazepam (ATIVAN) 0.5 MG Tab; Take 1/2 to 1 tab before eye procedure for anxiety  Indications: Feeling Anxious    Anxiety  -     LORazepam (ATIVAN) 0.5 MG Tab; Take 1/2 to 1 tab before eye procedure for anxiety  Indications: Feeling Anxious    medication filled at this time.  Patient is doing well with medication and does find that it is of benefit to help with her eye appointments.      A Risk of Abuse assessment for opioid abuse was previously preformed and documented in the past medical history. The " treatment plan was reviewed and discussed with the patient. The pharmacy monitoring report was requested and reviewed. The treatment plan did not need adjusting at this time.    Opioid Risk Score: 0    Interpretation of Opioid Risk Score   Score 0-3 = Low risk of abuse. Do UDS at least once per year.  Score 4-7 = Moderate risk of abuse. Do UDS 1-4 times per year.  Score 8+ = High risk of abuse. Refer to specialist.     Mixed hyperlipidemia    Will defer to cardiology.          Return in about 6 months (around 10/25/2024).    Please note that this dictation was created using voice recognition software. I have made every reasonable attempt to correct obvious errors, but expect that there are errors of grammar and possible content that I did not discover before finalizing note.

## 2024-09-06 ENCOUNTER — TELEPHONE (OUTPATIENT)
Dept: MEDICAL GROUP | Facility: CLINIC | Age: 74
End: 2024-09-06
Payer: MEDICARE

## 2024-09-09 NOTE — LETTER
September 9, 2024       Patient: Karolina Gilmore   YOB: 1950   Date of Visit: 9/9/2024         To Whom It May Concern:    Kendall experienced cardiac complications when she last received the flu vaccine.    If you have any questions or concerns, please don't hesitate to call [phone]          Sincerely,          Erika Arriaza P.A.-C.  Electronically Signed

## 2024-10-12 DIAGNOSIS — I10 ESSENTIAL HYPERTENSION: ICD-10-CM

## 2024-10-14 RX ORDER — LISINOPRIL 40 MG/1
TABLET ORAL
Qty: 90 TABLET | Refills: 0 | Status: SHIPPED | OUTPATIENT
Start: 2024-10-14

## 2024-10-31 ENCOUNTER — OFFICE VISIT (OUTPATIENT)
Dept: MEDICAL GROUP | Facility: CLINIC | Age: 74
End: 2024-10-31
Payer: MEDICARE

## 2024-10-31 VITALS
TEMPERATURE: 98.1 F | OXYGEN SATURATION: 96 % | BODY MASS INDEX: 30.3 KG/M2 | DIASTOLIC BLOOD PRESSURE: 64 MMHG | SYSTOLIC BLOOD PRESSURE: 122 MMHG | HEIGHT: 64 IN | HEART RATE: 74 BPM | WEIGHT: 177.47 LBS

## 2024-10-31 DIAGNOSIS — E78.2 MIXED HYPERLIPIDEMIA: ICD-10-CM

## 2024-10-31 DIAGNOSIS — F41.9 ANXIETY: ICD-10-CM

## 2024-10-31 DIAGNOSIS — I10 PRIMARY HYPERTENSION: ICD-10-CM

## 2024-10-31 DIAGNOSIS — H35.3221 EXUDATIVE AGE-RELATED MACULAR DEGENERATION OF LEFT EYE WITH ACTIVE CHOROIDAL NEOVASCULARIZATION (HCC): ICD-10-CM

## 2024-10-31 RX ORDER — LORAZEPAM 0.5 MG/1
TABLET ORAL
Qty: 30 TABLET | Refills: 0 | Status: SHIPPED | OUTPATIENT
Start: 2024-10-31 | End: 2024-11-30

## 2024-10-31 ASSESSMENT — FIBROSIS 4 INDEX: FIB4 SCORE: 1.29

## 2024-11-05 ENCOUNTER — HOSPITAL ENCOUNTER (OUTPATIENT)
Dept: RADIOLOGY | Facility: MEDICAL CENTER | Age: 74
End: 2024-11-05
Attending: INTERNAL MEDICINE
Payer: COMMERCIAL

## 2024-11-05 DIAGNOSIS — E78.5 HYPERLIPIDEMIA, UNSPECIFIED HYPERLIPIDEMIA TYPE: ICD-10-CM

## 2024-11-05 PROCEDURE — 4410556 CT-CARDIAC SCORING (SELF PAY ONLY)

## 2024-11-06 DIAGNOSIS — I10 ESSENTIAL HYPERTENSION: ICD-10-CM

## 2024-11-06 RX ORDER — AMLODIPINE BESYLATE 10 MG/1
TABLET ORAL
Qty: 90 TABLET | Refills: 3 | Status: SHIPPED | OUTPATIENT
Start: 2024-11-06 | End: 2024-11-12 | Stop reason: SDUPTHER

## 2024-11-06 NOTE — TELEPHONE ENCOUNTER
Is the patient due for a refill? Yes    Was the patient seen the past year? Yes    Date of last office visit: [id number]    Does the patient have an upcoming appointment?  Yes   If yes, When? [id number]    Provider to refill:vr    Does the patients insurance require a 100 day supply?  No

## 2024-11-12 ENCOUNTER — OFFICE VISIT (OUTPATIENT)
Dept: CARDIOLOGY | Facility: MEDICAL CENTER | Age: 74
End: 2024-11-12
Attending: INTERNAL MEDICINE
Payer: MEDICARE

## 2024-11-12 VITALS
WEIGHT: 179 LBS | RESPIRATION RATE: 16 BRPM | SYSTOLIC BLOOD PRESSURE: 118 MMHG | DIASTOLIC BLOOD PRESSURE: 68 MMHG | OXYGEN SATURATION: 97 % | BODY MASS INDEX: 30.56 KG/M2 | HEART RATE: 97 BPM | HEIGHT: 64 IN

## 2024-11-12 DIAGNOSIS — I10 ESSENTIAL HYPERTENSION: ICD-10-CM

## 2024-11-12 DIAGNOSIS — R93.1 AGATSTON CAC SCORE, <100: ICD-10-CM

## 2024-11-12 DIAGNOSIS — I10 PRIMARY HYPERTENSION: ICD-10-CM

## 2024-11-12 DIAGNOSIS — E78.2 MIXED HYPERLIPIDEMIA: ICD-10-CM

## 2024-11-12 DIAGNOSIS — I31.39 PERICARDIAL EFFUSION: ICD-10-CM

## 2024-11-12 PROCEDURE — 99213 OFFICE O/P EST LOW 20 MIN: CPT | Performed by: INTERNAL MEDICINE

## 2024-11-12 PROCEDURE — 99214 OFFICE O/P EST MOD 30 MIN: CPT | Performed by: INTERNAL MEDICINE

## 2024-11-12 PROCEDURE — 3078F DIAST BP <80 MM HG: CPT | Performed by: INTERNAL MEDICINE

## 2024-11-12 PROCEDURE — 3074F SYST BP LT 130 MM HG: CPT | Performed by: INTERNAL MEDICINE

## 2024-11-12 RX ORDER — LISINOPRIL 40 MG/1
40 TABLET ORAL DAILY
Qty: 100 TABLET | Refills: 3 | Status: SHIPPED | OUTPATIENT
Start: 2024-11-12

## 2024-11-12 RX ORDER — AMLODIPINE BESYLATE 10 MG/1
10 TABLET ORAL DAILY
Qty: 100 TABLET | Refills: 3 | Status: SHIPPED | OUTPATIENT
Start: 2024-11-12

## 2024-11-12 RX ORDER — HYDROCHLOROTHIAZIDE 25 MG/1
25 TABLET ORAL DAILY
Qty: 100 TABLET | Refills: 3 | Status: SHIPPED | OUTPATIENT
Start: 2024-11-12

## 2024-11-12 ASSESSMENT — ENCOUNTER SYMPTOMS
ABDOMINAL PAIN: 0
DYSPNEA ON EXERTION: 0
COUGH: 0
DEPRESSION: 0
ORTHOPNEA: 0
BLURRED VISION: 0
WEIGHT GAIN: 0
FEVER: 0
CLAUDICATION: 0
SHORTNESS OF BREATH: 0
SYNCOPE: 0
BACK PAIN: 0
ALTERED MENTAL STATUS: 0
VOMITING: 0
CONSTIPATION: 0
WEIGHT LOSS: 0
DECREASED APPETITE: 0
PALPITATIONS: 0
PND: 0
FLANK PAIN: 0
HEARTBURN: 0
IRREGULAR HEARTBEAT: 0
DIARRHEA: 0
NEAR-SYNCOPE: 0
DIZZINESS: 0
NAUSEA: 0

## 2024-11-12 ASSESSMENT — FIBROSIS 4 INDEX: FIB4 SCORE: 1.29

## 2024-11-12 NOTE — PROGRESS NOTES
Cardiology Note    Chief Complaint   Patient presents with    Hyperlipidemia     F/V Dx: Hyperlipidemia     History of Present Illness: Karolina Gilmore is a 74 y.o. female PMH HTN, HLD, agatston 0, pericardial effusion who presents for follow up.    Continues to do well without active cardiac complaints. She is active especially swimming without cardiovascular imitations. Compliant with medications and denies adverse effects.    Review of Systems   Constitutional: Negative for decreased appetite, fever, malaise/fatigue, weight gain and weight loss.   HENT:  Negative for congestion and nosebleeds.    Eyes:  Negative for blurred vision.   Cardiovascular:  Negative for chest pain, claudication, dyspnea on exertion, irregular heartbeat, leg swelling, near-syncope, orthopnea, palpitations, paroxysmal nocturnal dyspnea and syncope.   Respiratory:  Negative for cough and shortness of breath.    Endocrine: Negative for cold intolerance and heat intolerance.   Skin:  Negative for rash.   Musculoskeletal:  Negative for back pain.   Gastrointestinal:  Negative for abdominal pain, constipation, diarrhea, heartburn, melena, nausea and vomiting.   Genitourinary:  Negative for dysuria, flank pain and hematuria.   Neurological:  Negative for dizziness.   Psychiatric/Behavioral:  Negative for altered mental status and depression.          Past Medical History:   Diagnosis Date    Cerumen debris on tympanic membrane of right ear 5/24/2016    GERD (gastroesophageal reflux disease)     WEBBER (nonalcoholic steatohepatitis)     Vertigo 5/24/2017         Past Surgical History:   Procedure Laterality Date    HYSTERECTOMY, TOTAL ABDOMINAL  96    CARPAL TUNNEL RELEASE      right         Current Outpatient Medications   Medication Sig Dispense Refill    amLODIPine (NORVASC) 10 MG Tab Take 1 Tablet by mouth every day. 100 Tablet 3    hydroCHLOROthiazide 25 MG Tab Take 1 Tablet by mouth every day. 100 Tablet 3    lisinopril (PRINIVIL) 40 MG  tablet Take 1 Tablet by mouth every day. 100 Tablet 3    LORazepam (ATIVAN) 0.5 MG Tab Take 0.5 to 1 tab before eye procedure for anxiety  Indications: Feeling Anxious 30 Tablet 0    pantoprazole (PROTONIX) 20 MG tablet Take 20 mg by mouth every day.      NON SPECIFIED Eyleya eye injections one eye at a time once a month  Indications: avistan eye injections      Omega-3 Fatty Acids (FISH OIL) 1000 MG Cap capsule Take 1,000 mg by mouth 2 times a day.      Multiple Vitamins-Minerals (ICAPS AREDS 2 PO)       estradiol (VAGIFEM) 10 MCG Tab Insert 10 mcg into the vagina. TWICE Q WEEK.      Calcium Carbonate-Vitamin D (CALCIUM 500/D PO) Take  by mouth.      Probiotic Product (PROBIOTIC DAILY PO) Take  by mouth.      Multiple Vitamins-Minerals (MULTIVITAMIN PO) Take  by mouth.      vitamin D (CHOLECALCIFEROL) 1000 UNIT TABS Take 1,000 Units by mouth every day.       No current facility-administered medications for this visit.         Allergies   Allergen Reactions    Amoxicillin-Pot Clavulanate Rash    Augmentin     Ciprofloxacin Rash    Flu Virus Vaccine      Near syncope and cardiac arrythmia    Neomycin Rash         Family History   Problem Relation Age of Onset    Heart Attack Mother 76    Stroke Mother     Other Father 52        Brain tumour          Social History     Socioeconomic History    Marital status:      Spouse name: Not on file    Number of children: Not on file    Years of education: Not on file    Highest education level: Bachelor's degree (e.g., BA, AB, BS)   Occupational History    Not on file   Tobacco Use    Smoking status: Never    Smokeless tobacco: Never   Vaping Use    Vaping status: Never Used   Substance and Sexual Activity    Alcohol use: Not Currently    Drug use: No    Sexual activity: Never   Other Topics Concern    Not on file   Social History Narrative    Not on file     Social Drivers of Health     Financial Resource Strain: Low Risk  (8/27/2023)    Overall Financial Resource  "Strain (CARDIA)     Difficulty of Paying Living Expenses: Not hard at all   Food Insecurity: No Food Insecurity (8/27/2023)    Hunger Vital Sign     Worried About Running Out of Food in the Last Year: Never true     Ran Out of Food in the Last Year: Never true   Transportation Needs: No Transportation Needs (8/27/2023)    PRAPARE - Transportation     Lack of Transportation (Medical): No     Lack of Transportation (Non-Medical): No   Physical Activity: Sufficiently Active (8/27/2023)    Exercise Vital Sign     Days of Exercise per Week: 5 days     Minutes of Exercise per Session: 50 min   Stress: Stress Concern Present (8/27/2023)    [employer] [place] [employer] - Occupational Stress Questionnaire     Feeling of Stress : Rather much   Social Connections: Moderately Isolated (8/27/2023)    Social Connection and Isolation Panel [NHANES]     Frequency of Communication with Friends and Family: Three times a week     Frequency of Social Gatherings with Friends and Family: Twice a week     Attends Druze Services: Never     Active Member of Clubs or Organizations: Yes     Attends Club or Organization Meetings: 1 to 4 times per year     Marital Status:    Intimate Partner Violence: Not on file   Housing Stability: Low Risk  (8/27/2023)    Housing Stability Vital Sign     Unable to Pay for Housing in the Last Year: No     Number of Places Lived in the Last Year: 1     Unstable Housing in the Last Year: No         Physical Exam:  Ambulatory Vitals  /68 (BP Location: Left arm, Patient Position: Sitting, BP Cuff Size: Adult)   Pulse 97   Resp 16   Ht 1.626 m (5' 4\")   Wt 81.2 kg (179 lb)   SpO2 97%    BP Readings from Last 4 Encounters:   11/12/24 118/68   10/31/24 122/64   04/25/24 124/62   11/17/23 124/62     Weight/BMI:   Vitals:    11/12/24 1028   BP: 118/68   Weight: 81.2 kg (179 lb)   Height: 1.626 m (5' 4\")    Body mass index is 30.73 kg/m².  Wt Readings from Last 4 Encounters: "   11/12/24 81.2 kg (179 lb)   10/31/24 80.5 kg (177 lb 7.5 oz)   04/25/24 82.5 kg (181 lb 14.1 oz)   11/17/23 80 kg (176 lb 6.4 oz)       Physical Exam  Constitutional:       General: She is not in acute distress.  HENT:      Head: Normocephalic and atraumatic.   Eyes:      Conjunctiva/sclera: Conjunctivae normal.      Pupils: Pupils are equal, round, and reactive to light.   Neck:      Vascular: No JVD.   Cardiovascular:      Rate and Rhythm: Normal rate and regular rhythm.      Heart sounds: Normal heart sounds. No murmur heard.     No friction rub. No gallop.   Pulmonary:      Effort: Pulmonary effort is normal. No respiratory distress.      Breath sounds: Normal breath sounds. No wheezing or rales.   Chest:      Chest wall: No tenderness.   Abdominal:      General: Bowel sounds are normal. There is no distension.      Palpations: Abdomen is soft.   Musculoskeletal:      Cervical back: Normal range of motion and neck supple.   Skin:     General: Skin is warm and dry.   Neurological:      Mental Status: She is alert and oriented to person, place, and time.   Psychiatric:         Mood and Affect: Affect normal.         Judgment: Judgment normal.         Lab Data Review:  Lab Results   Component Value Date/Time    CHOLSTRLTOT 285 (H) 10/26/2023 08:36 AM     (H) 10/26/2023 08:36 AM    HDL 62 10/26/2023 08:36 AM    TRIGLYCERIDE 212 (H) 10/26/2023 08:36 AM       Lab Results   Component Value Date/Time    SODIUM 138 10/26/2023 08:36 AM    POTASSIUM 4.3 10/26/2023 08:36 AM    CHLORIDE 99 10/26/2023 08:36 AM    CO2 28 10/26/2023 08:36 AM    GLUCOSE 104 (H) 10/26/2023 08:36 AM    BUN 18 10/26/2023 08:36 AM    CREATININE 0.99 10/26/2023 08:36 AM     CrCl cannot be calculated (Patient's most recent lab result is older than the maximum 7 days allowed.).  Lab Results   Component Value Date/Time    ALKPHOSPHAT 82 10/26/2023 08:36 AM    ASTSGOT 23 10/26/2023 08:36 AM    ALTSGPT 19 10/26/2023 08:36 AM    TBILIRUBIN 0.6  "10/26/2023 08:36 AM      Lab Results   Component Value Date/Time    WBC 6.5 01/24/2023 09:11 AM     Lab Results   Component Value Date/Time    HBA1C 5.5 11/06/2013 07:11 AM     No components found for: \"TROP\"      Cardiac Imaging and Procedures Review:      EKG 1/22/20 - sinus, first deg AVB, normal qtc    CAC CT 8/2021  Coronary calcification:  LMA - 0.0  LCX - 0.0  LAD - 0.0  RCA - 0.0  PDA - 0.0  Total Calcium Score: 0.0    TTE 2/11/2020  CONCLUSIONS  Compared to the report of the prior study done 10/16/18 -there is been   no significant change.  Normal left ventricular chamber size.  Left ventricular ejection fraction is visually estimated to be 65%.  Indeterminate diastolic function.  No significant valve abnormalities.   Small pericardial effusion without evidence of hemodynamic compromise.  Left Ventricle  Normal left ventricular chamber size. Normal left ventricular wall   thickness. Normal left ventricular systolic function. Left ventricular   ejection fraction is visually estimated to be 65%. Normal regional wall   motion. Indeterminate diastolic function.  Pericardium  Small pericardial effusion without evidence of hemodynamic compromise.    TTE 10/2018  CONCLUSIONS  Normal left ventricular systolic function.  Left ventricular ejection fraction is visually estimated to be 65%.  Mild mitral regurgitation.  Small pericardial effusion without evidence of hemodynamic compromise.  No Doppler evidence of patent foramen ovale.    Medical Decision Making:  Problem List Items Addressed This Visit       Essential hypertension    Relevant Medications    amLODIPine (NORVASC) 10 MG Tab    hydroCHLOROthiazide 25 MG Tab    lisinopril (PRINIVIL) 40 MG tablet    Pericardial effusion    Relevant Medications    amLODIPine (NORVASC) 10 MG Tab    hydroCHLOROthiazide 25 MG Tab    lisinopril (PRINIVIL) 40 MG tablet    Hyperlipidemia    Relevant Medications    amLODIPine (NORVASC) 10 MG Tab    hydroCHLOROthiazide 25 MG Tab    " lisinopril (PRINIVIL) 40 MG tablet    Agatston CAC score, <100       Pericardial effusion - stable on multiple echocardiograms. Monitor for symptoms. No further testing at this time.    HTN - controlled. Goal <130/80. Continue HCTZ, lisinopril and amlodipine. Monitor at home.     HLD / 10 year risk ascvd intermediate / CAC CT agatston 0 - low risk setting zero calcium. Serial testing. No need for pharmacotherapy for risk reduction as patient low risk for CHD. Continue heart healthy diet. Annual lipids with primary.     It was my pleasure to meet with Ms. Gilmore.

## 2024-12-09 ENCOUNTER — TELEPHONE (OUTPATIENT)
Dept: CARDIOLOGY | Facility: MEDICAL CENTER | Age: 74
End: 2024-12-09
Payer: MEDICARE

## 2024-12-10 ENCOUNTER — TELEPHONE (OUTPATIENT)
Dept: CARDIOLOGY | Facility: MEDICAL CENTER | Age: 74
End: 2024-12-10
Payer: MEDICARE

## 2024-12-10 NOTE — TELEPHONE ENCOUNTER
Last OV: 11.12.2024  Proposed Surgery: Colonoscopy with Deep (Propofol) Sedation  Surgery Date: TBD  Requesting Office Name: [facility]  Fax Number: [phone]  Preference of Location (default is surgery center unless specified by Cardiologist or JENNY)  Prior Clearance Addressed: No    Is this a general clearance? YES   Anticoags/Antiplatelets: Other NONE  Anticoags/Antiplatelet managed by Cardiology? YES    Outstanding Cardiac Imaging : Yes  Echo.   Clearance to provider to review  Ablation, Cardioversion, Stent, Cardiac Devices, Catheterization, Watchman: No  TAVR/Valve, Mitral Clip, Watchman (including open heart),: N/A   Recent Cardiac Hospitalization: No            When: N/A  History (cardiac history):   Past Medical History:   Diagnosis Date    Cerumen debris on tympanic membrane of right ear 5/24/2016    GERD (gastroesophageal reflux disease)     WEBBER (nonalcoholic steatohepatitis)     Vertigo 5/24/2017           Is this a dental clearance? NO  Ablation, Cardioversion, Watchman, Stents, Cath, Devices within the last 3 months? No   If yes- Send dental wait letter, do not forward to provider for review.     TAVR / Valve, Mitral clip within the last 6 months? No  If yes- Send dental wait letter, do not forward to provider for review.     If completing a general clearance, continue per protocol.           Surgical Clearance Letter Sent: No Provider to advise. VR  **Scan clearance request letter into [facility].**

## 2024-12-10 NOTE — LETTER
PROCEDURE/SURGERY CLEARANCE FORM      Encounter Date: 12/10/2024    Patient: Karolina Gilmore  YOB: 1950    CARDIOLOGIST:  Kerwin Brunson M.D.    REFERRING DOCTOR:  No ref. provider found    The following procedure/surgery: Colonoscopy with Deep (Propofol) Sedation                                            Additional comments: No further cardiac testing would modify perioperative risk for low risk procedure. Thank you!     PROCEDURE/SURGERY CLEARANCE FORM    Date: 12/12/2024   Patient Name: Karolina Gilmore    Dear Surgeon or Proceduralist,      Thank you for your request for cardiac stratification of our mutual patient Karolina Gilmore 1950. We have reviewed their [facility] records; and to the best of our understanding this patient has not had stenting, ablation, watchman, cardiothoracic surgery or hospitalization for cardiovascular reasons in the past 6 months.  Karolina Gilmore has been seen within the past 15 months and is considered to have non-modifiable cardiac risk for this low-risk procedure/surgery. They may proceed from a cardiovascular standpoint and may hold their antiplatelet/anticoagulation as briefly as possible. Please have patient resume this medication when hemodynamically stable to do so.     Aspirin or Prasugrel   - hold 7 days prior to procedure/surgery, resume when hemodynamically stable      Clopidrogrel or Ticagrelor  - hold 7 days for all neurological procedures, hold 5 days prior to all other procedure/surgery,  resume when hemodynamically stable     Warfarin - hold 7 days for all neurological procedures, hold 5 days prior to all other procedure/surgery and coordinate with [facility] ([phone]) INR testing and dose management.      Pradaxa/Xarelto/Eliquis/Savesya - hold 1 day prior to procedure for low bleeding risk procedure, 2 days for high bleeding risk procedure, or consider holding 3 days or longer for patients with reduced kidney  function (CrCl <30mL/min) or spinal/cranial surgeries/procedures.      If they have a mechanical heart valve, please coordinate with [facility] ([phone]) the proper management of their anticoagulant in the periprocedural or perioperative period.      Some patients have higher risk for cardiovascular complications or holding medication. If our patient has had prior complications of holding antiplatelet or anticoagulants in the past and we have seen them after these events, we have addressed these concerns with the patient. They are at an unknown degree of increased risk for recurrent complication.  You may hold anticoagulation/antiplatelets for the procedure or surgery if the benefits of the procedure or surgery outweigh this nonmodifiable risk.      If Karolina Gilmore 1950 has new symptoms of heart failure decompensation, unstable arrythmia, or angina please reach out and we will assess the patient.      If you have other patient-specific concerns, please feel free to reach out to the patient's cardiologist directly at [phone].     Thank you,       [facility]       Electronically Signed        MD Signature   Kerwin Brunson M.D.

## 2025-02-18 ENCOUNTER — TELEPHONE (OUTPATIENT)
Dept: CARDIOLOGY | Facility: MEDICAL CENTER | Age: 75
End: 2025-02-18
Payer: MEDICARE

## 2025-03-27 ENCOUNTER — RESULTS FOLLOW-UP (OUTPATIENT)
Dept: MEDICAL GROUP | Facility: CLINIC | Age: 75
End: 2025-03-27
Payer: MEDICARE

## 2025-03-27 ENCOUNTER — HOSPITAL ENCOUNTER (OUTPATIENT)
Dept: RADIOLOGY | Facility: MEDICAL CENTER | Age: 75
End: 2025-03-27
Attending: PHYSICIAN ASSISTANT
Payer: MEDICARE

## 2025-03-27 DIAGNOSIS — Z12.31 ENCOUNTER FOR MAMMOGRAM TO ESTABLISH BASELINE MAMMOGRAM: ICD-10-CM

## 2025-03-27 PROCEDURE — 77067 SCR MAMMO BI INCL CAD: CPT

## 2025-04-03 ENCOUNTER — OFFICE VISIT (OUTPATIENT)
Dept: MEDICAL GROUP | Facility: CLINIC | Age: 75
End: 2025-04-03
Payer: MEDICARE

## 2025-04-03 VITALS
HEIGHT: 64 IN | DIASTOLIC BLOOD PRESSURE: 60 MMHG | SYSTOLIC BLOOD PRESSURE: 120 MMHG | RESPIRATION RATE: 16 BRPM | TEMPERATURE: 97.6 F | OXYGEN SATURATION: 97 % | HEART RATE: 60 BPM | BODY MASS INDEX: 32.74 KG/M2 | WEIGHT: 191.8 LBS

## 2025-04-03 DIAGNOSIS — F41.9 ANXIETY: ICD-10-CM

## 2025-04-03 DIAGNOSIS — L65.9 HAIR LOSS: ICD-10-CM

## 2025-04-03 DIAGNOSIS — E78.2 MIXED HYPERLIPIDEMIA: ICD-10-CM

## 2025-04-03 PROCEDURE — 99214 OFFICE O/P EST MOD 30 MIN: CPT | Performed by: PHYSICIAN ASSISTANT

## 2025-04-03 PROCEDURE — 3074F SYST BP LT 130 MM HG: CPT | Performed by: PHYSICIAN ASSISTANT

## 2025-04-03 PROCEDURE — 3078F DIAST BP <80 MM HG: CPT | Performed by: PHYSICIAN ASSISTANT

## 2025-04-03 RX ORDER — LORAZEPAM 0.5 MG/1
TABLET ORAL
Qty: 30 TABLET | Refills: 0 | Status: SHIPPED | OUTPATIENT
Start: 2025-04-03 | End: 2025-05-03

## 2025-04-03 ASSESSMENT — PATIENT HEALTH QUESTIONNAIRE - PHQ9: CLINICAL INTERPRETATION OF PHQ2 SCORE: 0

## 2025-04-03 NOTE — PROGRESS NOTES
Cc:  medication refill    Subjective:     Karolina Gilmore is a 75 y.o. female presenting for medication refill        History of Present Illness  The patient is a 75-year-old female who presents to the office today for a medication refill and hair loss.    She reports experiencing significant hair loss over the past few months, with no signs of improvement. She hypothesizes that this could be due to a suspected COVID-19 infection in December 2024, although she did not undergo testing at that time. Additionally, she mentions a recent change in her ocular medication regimen, which coincided with the onset of her hair loss. She reports no increase in stress levels. She has an upcoming annual dermatology appointment scheduled for May 2025.    She requests a refill of her lorazepam prescription.    Patient has hyperlipidemia and is due for routine labs.      MEDICATIONS  Lorazepam       Review of systems:  See above.   Denies any symptoms unless previously indicated.        Current Outpatient Medications:     LORazepam (ATIVAN) 0.5 MG Tab, Take 0.5 to 1 tab before eye procedure for anxiety.  Indications: Feeling Anxious, Disp: 30 Tablet, Rfl: 0    amLODIPine (NORVASC) 10 MG Tab, Take 1 Tablet by mouth every day., Disp: 100 Tablet, Rfl: 3    hydroCHLOROthiazide 25 MG Tab, Take 1 Tablet by mouth every day., Disp: 100 Tablet, Rfl: 3    lisinopril (PRINIVIL) 40 MG tablet, Take 1 Tablet by mouth every day., Disp: 100 Tablet, Rfl: 3    pantoprazole (PROTONIX) 20 MG tablet, Take 20 mg by mouth every day., Disp: , Rfl:     Omega-3 Fatty Acids (FISH OIL) 1000 MG Cap capsule, Take 1,000 mg by mouth 2 times a day., Disp: , Rfl:     Multiple Vitamins-Minerals (ICAPS AREDS 2 PO), , Disp: , Rfl:     estradiol (VAGIFEM) 10 MCG Tab, Insert 10 mcg into the vagina. TWICE Q WEEK., Disp: , Rfl:     Calcium Carbonate-Vitamin D (CALCIUM 500/D PO), Take  by mouth., Disp: , Rfl:     Probiotic Product (PROBIOTIC DAILY PO), Take  by mouth.,  "Disp: , Rfl:     Multiple Vitamins-Minerals (MULTIVITAMIN PO), Take  by mouth., Disp: , Rfl:     vitamin D (CHOLECALCIFEROL) 1000 UNIT TABS, Take 1,000 Units by mouth every day., Disp: , Rfl:     Allergies, past medical history, past surgical history, family history, social history reviewed and updated    Objective:     Vitals: /60 (BP Location: Left arm, Patient Position: Sitting, BP Cuff Size: Adult)   Pulse 60   Temp 36.4 °C (97.6 °F) (Temporal)   Resp 16   Ht 1.626 m (5' 4\")   Wt 87 kg (191 lb 12.8 oz)   SpO2 97%   BMI 32.92 kg/m²   General: Alert, pleasant, NAD  EYES:   PERRL, EOMI, no icterus or pallor.  Conjunctivae and lids normal.   HENT:  Normocephalic.  External ears normal.   Neck supple.     Respiratory: Normal respiratory effort.    Abdomen: Not obese  Skin: Warm, dry, no rashes.  Musculoskeletal: Gait is normal.  Moves all extremities well.    Extremities: normal range of motion all extremities.   Neurological: No tremors, sensation grossly intact, CN2-12 intact.  Psych:  Affect/mood is normal, judgement is good, memory is intact, grooming is appropriate.      Results  Imaging  Mammogram was normal.       Assessment/Plan:     Karolina was seen today for medication refill and hair loss.    Diagnoses and all orders for this visit:    Hair loss  -     Comp Metabolic Panel; Future  -     DHEA SULFATE; Future  -     CBC WITH DIFFERENTIAL; Future  -     TRIIDOTHYRONINE; Future  -     FREE THYROXINE; Future  -     TSH; Future  -     ANTITHYROGLOBULIN AB; Future  -     THYROID PEROXIDASE  (TPO) AB; Future    Anxiety  -     LORazepam (ATIVAN) 0.5 MG Tab; Take 0.5 to 1 tab before eye procedure for anxiety.  Indications: Feeling Anxious    Mixed hyperlipidemia  -     Lipid Profile; Future        Assessment & Plan  1. Hair loss.  She reports significant hair loss over the past couple of months, which she attributes to either a possible COVID-19 infection in December or a change in her eye medication. " The hair loss has not improved. Differential diagnoses include thyroid issues or hormonal imbalances. A comprehensive set of laboratory tests will be ordered, including thyroid function tests, DHEA levels, complete blood count, liver and kidney function tests, glucose levels, and cholesterol levels. These tests will be conducted in a fasting state. If the laboratory results indicate a thyroid issue, she will be contacted to discuss potential medication management. If the results are normal, a referral to dermatology may be considered.    2. anxiety  A prescription for lorazepam 0.5 mg will be refilled. The prescription will be sent to [facility] pharmacy.    3.  Hyperlipidemia  Labs ordered to evaluate further.  Follow up 6-8 weeks.      Follow-up  The patient will follow up in 8 weeks.    Return in about 8 weeks (around 5/29/2025), or if symptoms worsen or fail to improve, for follow up hair loss.    Please note that this dictation was created using voice recognition software. I have made every reasonable attempt to correct obvious errors, but expect that there are errors of grammar and possible content that I did not discover before finalizing note.

## 2025-04-15 ENCOUNTER — HOSPITAL ENCOUNTER (OUTPATIENT)
Dept: LAB | Facility: MEDICAL CENTER | Age: 75
End: 2025-04-15
Attending: PHYSICIAN ASSISTANT
Payer: MEDICARE

## 2025-04-15 DIAGNOSIS — L65.9 HAIR LOSS: ICD-10-CM

## 2025-04-15 DIAGNOSIS — E78.2 MIXED HYPERLIPIDEMIA: ICD-10-CM

## 2025-04-15 LAB
BASOPHILS # BLD AUTO: 1.2 % (ref 0–1.8)
BASOPHILS # BLD: 0.07 K/UL (ref 0–0.12)
EOSINOPHIL # BLD AUTO: 0.29 K/UL (ref 0–0.51)
EOSINOPHIL NFR BLD: 4.9 % (ref 0–6.9)
ERYTHROCYTE [DISTWIDTH] IN BLOOD BY AUTOMATED COUNT: 44.6 FL (ref 35.9–50)
HCT VFR BLD AUTO: 45.1 % (ref 37–47)
HGB BLD-MCNC: 14.2 G/DL (ref 12–16)
IMM GRANULOCYTES # BLD AUTO: 0.02 K/UL (ref 0–0.11)
IMM GRANULOCYTES NFR BLD AUTO: 0.3 % (ref 0–0.9)
LYMPHOCYTES # BLD AUTO: 2.81 K/UL (ref 1–4.8)
LYMPHOCYTES NFR BLD: 47.1 % (ref 22–41)
MCH RBC QN AUTO: 27.3 PG (ref 27–33)
MCHC RBC AUTO-ENTMCNC: 31.5 G/DL (ref 32.2–35.5)
MCV RBC AUTO: 86.7 FL (ref 81.4–97.8)
MONOCYTES # BLD AUTO: 0.55 K/UL (ref 0–0.85)
MONOCYTES NFR BLD AUTO: 9.2 % (ref 0–13.4)
NEUTROPHILS # BLD AUTO: 2.23 K/UL (ref 1.82–7.42)
NEUTROPHILS NFR BLD: 37.3 % (ref 44–72)
NRBC # BLD AUTO: 0 K/UL
NRBC BLD-RTO: 0 /100 WBC (ref 0–0.2)
PLATELET # BLD AUTO: 266 K/UL (ref 164–446)
PMV BLD AUTO: 10.4 FL (ref 9–12.9)
RBC # BLD AUTO: 5.2 M/UL (ref 4.2–5.4)
WBC # BLD AUTO: 6 K/UL (ref 4.8–10.8)

## 2025-04-15 PROCEDURE — 80061 LIPID PANEL: CPT

## 2025-04-15 PROCEDURE — 84439 ASSAY OF FREE THYROXINE: CPT

## 2025-04-15 PROCEDURE — 84443 ASSAY THYROID STIM HORMONE: CPT

## 2025-04-15 PROCEDURE — 85025 COMPLETE CBC W/AUTO DIFF WBC: CPT

## 2025-04-15 PROCEDURE — 36415 COLL VENOUS BLD VENIPUNCTURE: CPT

## 2025-04-15 PROCEDURE — 82627 DEHYDROEPIANDROSTERONE: CPT

## 2025-04-15 PROCEDURE — 86800 THYROGLOBULIN ANTIBODY: CPT

## 2025-04-15 PROCEDURE — 86376 MICROSOMAL ANTIBODY EACH: CPT

## 2025-04-15 PROCEDURE — 80053 COMPREHEN METABOLIC PANEL: CPT

## 2025-04-15 PROCEDURE — 84480 ASSAY TRIIODOTHYRONINE (T3): CPT

## 2025-04-16 ENCOUNTER — RESULTS FOLLOW-UP (OUTPATIENT)
Dept: MEDICAL GROUP | Facility: CLINIC | Age: 75
End: 2025-04-16
Payer: MEDICARE

## 2025-04-16 LAB
ALBUMIN SERPL BCP-MCNC: 4.3 G/DL (ref 3.2–4.9)
ALBUMIN/GLOB SERPL: 1.5 G/DL
ALP SERPL-CCNC: 87 U/L (ref 30–99)
ALT SERPL-CCNC: 20 U/L (ref 2–50)
ANION GAP SERPL CALC-SCNC: 12 MMOL/L (ref 7–16)
AST SERPL-CCNC: 23 U/L (ref 12–45)
BILIRUB SERPL-MCNC: 0.4 MG/DL (ref 0.1–1.5)
BUN SERPL-MCNC: 16 MG/DL (ref 8–22)
CALCIUM ALBUM COR SERPL-MCNC: 9.4 MG/DL (ref 8.5–10.5)
CALCIUM SERPL-MCNC: 9.6 MG/DL (ref 8.5–10.5)
CHLORIDE SERPL-SCNC: 103 MMOL/L (ref 96–112)
CHOLEST SERPL-MCNC: 273 MG/DL (ref 100–199)
CO2 SERPL-SCNC: 27 MMOL/L (ref 20–33)
CREAT SERPL-MCNC: 0.98 MG/DL (ref 0.5–1.4)
DHEA-S SERPL-MCNC: 53.3 UG/DL (ref 12–154)
FASTING STATUS PATIENT QL REPORTED: NORMAL
GFR SERPLBLD CREATININE-BSD FMLA CKD-EPI: 61 ML/MIN/1.73 M 2
GLOBULIN SER CALC-MCNC: 2.9 G/DL (ref 1.9–3.5)
GLUCOSE SERPL-MCNC: 97 MG/DL (ref 65–99)
HDLC SERPL-MCNC: 58 MG/DL
LDLC SERPL CALC-MCNC: 175 MG/DL
POTASSIUM SERPL-SCNC: 4 MMOL/L (ref 3.6–5.5)
PROT SERPL-MCNC: 7.2 G/DL (ref 6–8.2)
SODIUM SERPL-SCNC: 142 MMOL/L (ref 135–145)
T3 SERPL-MCNC: 89.2 NG/DL (ref 60–181)
T4 FREE SERPL-MCNC: 1.08 NG/DL (ref 0.93–1.7)
THYROPEROXIDASE AB SERPL-ACNC: 30.1 IU/ML (ref 0–9)
TRIGL SERPL-MCNC: 198 MG/DL (ref 0–149)
TSH SERPL-ACNC: 2.96 UIU/ML (ref 0.38–5.33)

## 2025-04-17 LAB — THYROGLOB AB SERPL-ACNC: 2.6 IU/ML (ref 0–4)

## 2025-06-03 ENCOUNTER — OFFICE VISIT (OUTPATIENT)
Dept: MEDICAL GROUP | Facility: CLINIC | Age: 75
End: 2025-06-03
Payer: MEDICARE

## 2025-06-03 VITALS
HEIGHT: 64 IN | RESPIRATION RATE: 16 BRPM | BODY MASS INDEX: 31.58 KG/M2 | TEMPERATURE: 97 F | SYSTOLIC BLOOD PRESSURE: 126 MMHG | WEIGHT: 184.97 LBS | DIASTOLIC BLOOD PRESSURE: 60 MMHG | OXYGEN SATURATION: 95 % | HEART RATE: 65 BPM

## 2025-06-03 DIAGNOSIS — E78.2 MIXED HYPERLIPIDEMIA: ICD-10-CM

## 2025-06-03 DIAGNOSIS — L65.9 HAIR LOSS: Primary | ICD-10-CM

## 2025-06-03 DIAGNOSIS — R76.8 THYROID ANTIBODY POSITIVE: ICD-10-CM

## 2025-06-03 DIAGNOSIS — H35.30 MACULAR DEGENERATION OF BOTH EYES, UNSPECIFIED TYPE: ICD-10-CM

## 2025-06-03 PROCEDURE — 3074F SYST BP LT 130 MM HG: CPT | Performed by: PHYSICIAN ASSISTANT

## 2025-06-03 PROCEDURE — 99214 OFFICE O/P EST MOD 30 MIN: CPT | Performed by: PHYSICIAN ASSISTANT

## 2025-06-03 PROCEDURE — 3078F DIAST BP <80 MM HG: CPT | Performed by: PHYSICIAN ASSISTANT

## 2025-06-03 ASSESSMENT — FIBROSIS 4 INDEX: FIB4 SCORE: 1.43

## 2025-06-03 NOTE — PROGRESS NOTES
cc:  hyperlipidemia    Subjective:     Karolina Gilmore is a 75 y.o. female presenting for hyperlipidemia        History of Present Illness  The patient is a 75-year-old female who presents to the office today to follow up on her most recent test results, which show a low neutrophil count, an elevated lymphocyte count, an LDL of 175, a total cholesterol of 273, a TPO antibody of 30.1, and a TSH of 2.96.    She continues to experience hair loss, which was discussed during a recent dermatology appointment. The dermatologist inquired about any physical or emotional stressors in the past 1.5 years, to which she affirmed their presence. The dermatologist suggested that these stressors could be contributing to her hair loss and recommended Rogaine as a potential treatment. However, she is hesitant to use Rogaine due to concerns about its efficacy once discontinued and potential hair growth in undesired areas. The dermatologist did not observe any bald spots or hair loss upon gentle pulling. She recalls a previous adverse reaction to a hair product that caused severe itching.    She has been managing her cholesterol levels through diet and exercise, as advised by her cardiologist. She was previously on statin therapy, which effectively lowered her cholesterol levels, but has since discontinued it. She underwent a CT scan on 11/12/2024, which showed a calcium score of zero. She is scheduled for a follow-up with her cardiologist in one year.    She has a family history of macular degeneration, with two known relatives affected by the condition. She is currently receiving lorazepam for her eye injections.    FAMILY HISTORY  - Two relatives with macular degeneration       Review of systems:  See above.   Denies any symptoms unless previously indicated.      Current Medications[1]    Allergies, past medical history, past surgical history, family history, social history reviewed and updated    Objective:     Vitals: /60  "(BP Location: Left arm, Patient Position: Sitting, BP Cuff Size: Large adult)   Pulse 65   Temp 36.1 °C (97 °F) (Temporal)   Resp 16   Ht 1.626 m (5' 4\")   Wt 83.9 kg (184 lb 15.5 oz)   SpO2 95%   BMI 31.75 kg/m²   General: Alert, pleasant, NAD  EYES:   PERRL, EOMI, no icterus or pallor.  Conjunctivae and lids normal.   HENT:  Normocephalic.  External ears normal. Neck supple.     Respiratory: Normal respiratory effort.    Abdomen: obese  Skin: Warm, dry, no rashes.  Musculoskeletal: Gait is normal.  Moves all extremities well.    Neurological: No tremors, sensation grossly intact, patellar reflexes 2+ symmetric, tone/strength normal, gait is normal, CN2-12 intact.  Psych:  Affect/mood is normal, judgement is good, memory is intact, grooming is appropriate.    Physical Exam  Skin: No abnormalities, no rashes or lesions       Results  Labs   - Complete Blood Count: Low neutrophil count and elevated lymphocyte count   - Lipid Panel: LDL of 175, total cholesterol of 273   - TPO Antibody: 30.1   - TSH: 2.96   - Free T4: Normal   - Testosterone Levels: Normal   - Liver Function Test: Normal   - Kidney Function Test: Normal   - Blood Glucose Test: Improved from 104 to 97    Imaging   - CT scan: Calcium score of zero      Latest Reference Range & Units 04/15/25 08:23   WBC 4.8 - 10.8 K/uL 6.0   RBC 4.20 - 5.40 M/uL 5.20   Hemoglobin 12.0 - 16.0 g/dL 14.2   Hematocrit 37.0 - 47.0 % 45.1   MCV 81.4 - 97.8 fL 86.7   MCH 27.0 - 33.0 pg 27.3   MCHC 32.2 - 35.5 g/dL 31.5 (L)   RDW 35.9 - 50.0 fL 44.6   Platelet Count 164 - 446 K/uL 266   MPV 9.0 - 12.9 fL 10.4   Neutrophils-Polys 44.00 - 72.00 % 37.30 (L)   Neutrophils (Absolute) 1.82 - 7.42 K/uL 2.23   Lymphocytes 22.00 - 41.00 % 47.10 (H)   Lymphs (Absolute) 1.00 - 4.80 K/uL 2.81   Monocytes 0.00 - 13.40 % 9.20   Monos (Absolute) 0.00 - 0.85 K/uL 0.55   Eosinophils 0.00 - 6.90 % 4.90   Eos (Absolute) 0.00 - 0.51 K/uL 0.29   Basophils 0.00 - 1.80 % 1.20   Baso (Absolute) " 0.00 - 0.12 K/uL 0.07   Immature Granulocytes 0.00 - 0.90 % 0.30   Immature Granulocytes (abs) 0.00 - 0.11 K/uL 0.02   Nucleated RBC 0.00 - 0.20 /100 WBC 0.00   NRBC (Absolute) K/uL 0.00   Sodium 135 - 145 mmol/L 142   Potassium 3.6 - 5.5 mmol/L 4.0   Chloride 96 - 112 mmol/L 103   Co2 20 - 33 mmol/L 27   Anion Gap 7.0 - 16.0  12.0   Glucose 65 - 99 mg/dL 97   Bun 8 - 22 mg/dL 16   Creatinine 0.50 - 1.40 mg/dL 0.98   GFR (CKD-EPI) >60 mL/min/1.73 m 2 61   Calcium 8.5 - 10.5 mg/dL 9.6   Correct Calcium 8.5 - 10.5 mg/dL 9.4   AST(SGOT) 12 - 45 U/L 23   ALT(SGPT) 2 - 50 U/L 20   Alkaline Phosphatase 30 - 99 U/L 87   Total Bilirubin 0.1 - 1.5 mg/dL 0.4   Albumin 3.2 - 4.9 g/dL 4.3   Total Protein 6.0 - 8.2 g/dL 7.2   Globulin 1.9 - 3.5 g/dL 2.9   A-G Ratio g/dL 1.5   Fasting Status  Fasting   Cholesterol,Tot 100 - 199 mg/dL 273 (H)   Triglycerides 0 - 149 mg/dL 198 (H)   HDL >=40 mg/dL 58   LDL <100 mg/dL 175 (H)   TSH 0.380 - 5.330 uIU/mL 2.960   Free T-4 0.93 - 1.70 ng/dL 1.08   T3 60.0 - 181.0 ng/dL 89.2   Dhea-S 12.0 - 154.0 ug/dL 53.3   Microsomal -Tpo- Abs 0.0 - 9.0 IU/mL 30.1 (H)   Anti-Thyroglobulin 0.0 - 4.0 IU/mL 2.6   (L): Data is abnormally low  (H): Data is abnormally high    Assessment/Plan:     Karolina was seen today for lab results.    Diagnoses and all orders for this visit:    Hair loss    Mixed hyperlipidemia  -     Comp Metabolic Panel; Future  -     Lipid Profile; Future    Thyroid antibody positive  -     THYROID PEROXIDASE  (TPO) AB; Future    Macular degeneration of both eyes, unspecified type        Assessment & Plan  1. Hair loss.  - Hair loss potentially related to thyroid condition, specifically Hashimoto's thyroiditis, indicated by positive thyroid antibody test.  - No medication necessary at this point. [place] discussed but not recommended due to potential for hair growth in undesired areas.  - Repeat thyroid antibody test and TSH will be conducted in 6 months to monitor condition.  -  Patient advised to keep an eye on symptoms and report any significant changes.    2. Cholesterol management.  - Cholesterol levels remain elevated with LDL of 175 and total cholesterol of 273.  - Cardiologist previously advised against statin use due to patient's ability to control cholesterol through diet and exercise.  - Re-evaluation of cholesterol levels scheduled in 6 months to assess any improvement or progression.  - Patient encouraged to continue with diet and exercise regimen.    3.  positive thyroid antibody.  - Positive thyroid antibody test at 30.1, but TSH is within normal range at 2.96.  - No current need for thyroid medication, but condition will be monitored.  - Repeat thyroid antibody test and TSH in 6 months to assess any changes.  - Patient informed about the importance of monitoring thyroid function and reporting any new symptoms.    4. Macular degeneration.  - Currently receiving lorazepam injections for eye condition.  - Patient to follow up in 6 months for continued management of condition.  - Discussed the challenges of managing macular degeneration and the importance of regular follow-ups.    Follow-up  The patient will follow up in 6 months.    Return in about 6 months (around 12/3/2025), or if symptoms worsen or fail to improve.    Please note that this dictation was created using voice recognition software. I have made every reasonable attempt to correct obvious errors, but expect that there are errors of grammar and possible content that I did not discover before finalizing note.               [1]   Current Outpatient Medications:     amLODIPine (NORVASC) 10 MG Tab, Take 1 Tablet by mouth every day., Disp: 100 Tablet, Rfl: 3    hydroCHLOROthiazide 25 MG Tab, Take 1 Tablet by mouth every day., Disp: 100 Tablet, Rfl: 3    lisinopril (PRINIVIL) 40 MG tablet, Take 1 Tablet by mouth every day., Disp: 100 Tablet, Rfl: 3    pantoprazole (PROTONIX) 20 MG tablet, Take 20 mg by mouth every day.,  Disp: , Rfl:     Omega-3 Fatty Acids (FISH OIL) 1000 MG Cap capsule, Take 1,000 mg by mouth 2 times a day., Disp: , Rfl:     Multiple Vitamins-Minerals (ICAPS AREDS 2 PO), , Disp: , Rfl:     estradiol (VAGIFEM) 10 MCG Tab, Insert 10 mcg into the vagina. TWICE Q WEEK., Disp: , Rfl:     Calcium Carbonate-Vitamin D (CALCIUM 500/D PO), Take  by mouth., Disp: , Rfl:     Probiotic Product (PROBIOTIC DAILY PO), Take  by mouth., Disp: , Rfl:     Multiple Vitamins-Minerals (MULTIVITAMIN PO), Take  by mouth., Disp: , Rfl:     vitamin D (CHOLECALCIFEROL) 1000 UNIT TABS, Take 1,000 Units by mouth every day., Disp: , Rfl:

## 2025-09-03 ENCOUNTER — APPOINTMENT (OUTPATIENT)
Dept: RADIOLOGY | Facility: MEDICAL CENTER | Age: 75
End: 2025-09-03
Payer: MEDICARE

## 2025-12-04 ENCOUNTER — APPOINTMENT (OUTPATIENT)
Dept: MEDICAL GROUP | Facility: CLINIC | Age: 75
End: 2025-12-04
Payer: MEDICARE